# Patient Record
Sex: FEMALE
[De-identification: names, ages, dates, MRNs, and addresses within clinical notes are randomized per-mention and may not be internally consistent; named-entity substitution may affect disease eponyms.]

---

## 2019-03-20 ENCOUNTER — APPOINTMENT (OUTPATIENT)
Dept: OBGYN | Facility: CLINIC | Age: 44
End: 2019-03-20
Payer: COMMERCIAL

## 2019-03-20 VITALS
SYSTOLIC BLOOD PRESSURE: 127 MMHG | HEART RATE: 114 BPM | DIASTOLIC BLOOD PRESSURE: 81 MMHG | HEIGHT: 67.5 IN | BODY MASS INDEX: 30.87 KG/M2 | WEIGHT: 199 LBS

## 2019-03-20 DIAGNOSIS — Z87.440 PERSONAL HISTORY OF URINARY (TRACT) INFECTIONS: ICD-10-CM

## 2019-03-20 DIAGNOSIS — R10.2 PELVIC AND PERINEAL PAIN: ICD-10-CM

## 2019-03-20 PROCEDURE — 99213 OFFICE O/P EST LOW 20 MIN: CPT | Mod: 25

## 2019-03-20 PROCEDURE — 99396 PREV VISIT EST AGE 40-64: CPT

## 2019-03-20 NOTE — COUNSELING
[Breast Self Exam] : breast self exam [Vitamins/Supplements] : vitamins/supplements [Nutrition] : nutrition [Exercise] : exercise [Other ___] : [unfilled]

## 2019-03-20 NOTE — PHYSICAL EXAM
[Awake] : awake [Alert] : alert [Soft] : soft [Oriented x3] : oriented to person, place, and time [No Bleeding] : there was no active vaginal bleeding [Normal] : uterus [Uterine Adnexae] : were not tender and not enlarged [Adnexa Tenderness On The Left] : was tender to palpation [Acute Distress] : no acute distress [Mass] : no breast mass [Nipple Discharge] : no nipple discharge [Axillary LAD] : no axillary lymphadenopathy [Tender] : non tender [Adnexa Tenderness On The Right] : was not tender

## 2019-03-22 LAB
BACTERIA UR CULT: NORMAL
HPV HIGH+LOW RISK DNA PNL CVX: NOT DETECTED

## 2019-03-25 ENCOUNTER — ASOB RESULT (OUTPATIENT)
Age: 44
End: 2019-03-25

## 2019-03-25 ENCOUNTER — APPOINTMENT (OUTPATIENT)
Dept: OBGYN | Facility: CLINIC | Age: 44
End: 2019-03-25
Payer: COMMERCIAL

## 2019-03-25 LAB — CYTOLOGY CVX/VAG DOC THIN PREP: NORMAL

## 2019-03-25 PROCEDURE — 76857 US EXAM PELVIC LIMITED: CPT

## 2019-03-25 PROCEDURE — 76830 TRANSVAGINAL US NON-OB: CPT

## 2019-03-29 ENCOUNTER — APPOINTMENT (OUTPATIENT)
Dept: MAMMOGRAPHY | Facility: IMAGING CENTER | Age: 44
End: 2019-03-29
Payer: COMMERCIAL

## 2019-03-29 ENCOUNTER — OUTPATIENT (OUTPATIENT)
Dept: OUTPATIENT SERVICES | Facility: HOSPITAL | Age: 44
LOS: 1 days | End: 2019-03-29
Payer: COMMERCIAL

## 2019-03-29 DIAGNOSIS — Z98.890 OTHER SPECIFIED POSTPROCEDURAL STATES: Chronic | ICD-10-CM

## 2019-03-29 DIAGNOSIS — Z00.8 ENCOUNTER FOR OTHER GENERAL EXAMINATION: ICD-10-CM

## 2019-03-29 PROCEDURE — 77067 SCR MAMMO BI INCL CAD: CPT

## 2019-03-29 PROCEDURE — 77063 BREAST TOMOSYNTHESIS BI: CPT | Mod: 26

## 2019-03-29 PROCEDURE — 77067 SCR MAMMO BI INCL CAD: CPT | Mod: 26

## 2019-03-29 PROCEDURE — 77063 BREAST TOMOSYNTHESIS BI: CPT

## 2019-04-03 ENCOUNTER — OTHER (OUTPATIENT)
Age: 44
End: 2019-04-03

## 2019-04-03 DIAGNOSIS — N63.0 UNSPECIFIED LUMP IN UNSPECIFIED BREAST: ICD-10-CM

## 2019-04-04 ENCOUNTER — APPOINTMENT (OUTPATIENT)
Dept: OBGYN | Facility: CLINIC | Age: 44
End: 2019-04-04
Payer: COMMERCIAL

## 2019-04-04 VITALS
HEIGHT: 67 IN | BODY MASS INDEX: 30.45 KG/M2 | HEART RATE: 76 BPM | SYSTOLIC BLOOD PRESSURE: 116 MMHG | WEIGHT: 194 LBS | OXYGEN SATURATION: 98 % | DIASTOLIC BLOOD PRESSURE: 76 MMHG

## 2019-04-04 DIAGNOSIS — N94.6 DYSMENORRHEA, UNSPECIFIED: ICD-10-CM

## 2019-04-04 DIAGNOSIS — Z12.11 ENCOUNTER FOR SCREENING FOR MALIGNANT NEOPLASM OF COLON: ICD-10-CM

## 2019-04-04 DIAGNOSIS — R92.8 OTHER ABNORMAL AND INCONCLUSIVE FINDINGS ON DIAGNOSTIC IMAGING OF BREAST: ICD-10-CM

## 2019-04-04 LAB
HCG UR QL: NEGATIVE
QUALITY CONTROL: YES

## 2019-04-04 PROCEDURE — 99214 OFFICE O/P EST MOD 30 MIN: CPT

## 2019-04-04 RX ORDER — KETOROLAC TROMETHAMINE 10 MG/1
10 TABLET, FILM COATED ORAL EVERY 8 HOURS
Qty: 12 | Refills: 3 | Status: ACTIVE | COMMUNITY
Start: 2019-04-04 | End: 1900-01-01

## 2019-04-04 NOTE — CHIEF COMPLAINT
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

## 2019-04-04 NOTE — PHYSICAL EXAM
[Normal] : uterus [No Bleeding] : there was no active vaginal bleeding [Anteversion] : anteverted [Uterine Adnexae] : were not tender and not enlarged

## 2019-04-04 NOTE — COUNSELING
[Breast Self Exam] : breast self exam [Nutrition] : nutrition [Exercise] : exercise [Vitamins/Supplements] : vitamins/supplements [Pain Management] : pain management

## 2019-04-08 ENCOUNTER — TRANSCRIPTION ENCOUNTER (OUTPATIENT)
Age: 44
End: 2019-04-08

## 2019-04-08 ENCOUNTER — APPOINTMENT (OUTPATIENT)
Dept: ULTRASOUND IMAGING | Facility: CLINIC | Age: 44
End: 2019-04-08
Payer: COMMERCIAL

## 2019-04-08 ENCOUNTER — OUTPATIENT (OUTPATIENT)
Dept: OUTPATIENT SERVICES | Facility: HOSPITAL | Age: 44
LOS: 1 days | End: 2019-04-08
Payer: COMMERCIAL

## 2019-04-08 DIAGNOSIS — N63.0 UNSPECIFIED LUMP IN UNSPECIFIED BREAST: ICD-10-CM

## 2019-04-08 DIAGNOSIS — Z00.8 ENCOUNTER FOR OTHER GENERAL EXAMINATION: ICD-10-CM

## 2019-04-08 DIAGNOSIS — Z98.890 OTHER SPECIFIED POSTPROCEDURAL STATES: Chronic | ICD-10-CM

## 2019-04-08 PROCEDURE — 76642 ULTRASOUND BREAST LIMITED: CPT | Mod: 26,LT

## 2019-04-08 PROCEDURE — 76642 ULTRASOUND BREAST LIMITED: CPT

## 2019-09-04 ENCOUNTER — FORM ENCOUNTER (OUTPATIENT)
Age: 44
End: 2019-09-04

## 2019-09-05 ENCOUNTER — APPOINTMENT (OUTPATIENT)
Dept: ULTRASOUND IMAGING | Facility: CLINIC | Age: 44
End: 2019-09-05
Payer: COMMERCIAL

## 2019-09-05 ENCOUNTER — OUTPATIENT (OUTPATIENT)
Dept: OUTPATIENT SERVICES | Facility: HOSPITAL | Age: 44
LOS: 1 days | End: 2019-09-05
Payer: COMMERCIAL

## 2019-09-05 DIAGNOSIS — R10.2 PELVIC AND PERINEAL PAIN: ICD-10-CM

## 2019-09-05 DIAGNOSIS — Z98.890 OTHER SPECIFIED POSTPROCEDURAL STATES: Chronic | ICD-10-CM

## 2019-09-05 PROCEDURE — 76830 TRANSVAGINAL US NON-OB: CPT

## 2019-09-05 PROCEDURE — 76830 TRANSVAGINAL US NON-OB: CPT | Mod: 26

## 2019-09-05 PROCEDURE — 76856 US EXAM PELVIC COMPLETE: CPT

## 2019-09-05 PROCEDURE — 76856 US EXAM PELVIC COMPLETE: CPT | Mod: 26

## 2019-09-06 ENCOUNTER — OTHER (OUTPATIENT)
Age: 44
End: 2019-09-06

## 2019-09-09 ENCOUNTER — INPATIENT (INPATIENT)
Facility: HOSPITAL | Age: 44
LOS: 4 days | Discharge: HOME | End: 2019-09-14
Attending: STUDENT IN AN ORGANIZED HEALTH CARE EDUCATION/TRAINING PROGRAM | Admitting: STUDENT IN AN ORGANIZED HEALTH CARE EDUCATION/TRAINING PROGRAM
Payer: COMMERCIAL

## 2019-09-09 VITALS
DIASTOLIC BLOOD PRESSURE: 78 MMHG | OXYGEN SATURATION: 100 % | HEART RATE: 120 BPM | RESPIRATION RATE: 20 BRPM | SYSTOLIC BLOOD PRESSURE: 138 MMHG | TEMPERATURE: 99 F

## 2019-09-09 DIAGNOSIS — Z98.890 OTHER SPECIFIED POSTPROCEDURAL STATES: Chronic | ICD-10-CM

## 2019-09-09 LAB
ALBUMIN SERPL ELPH-MCNC: 4.3 G/DL — SIGNIFICANT CHANGE UP (ref 3.3–5)
ALBUMIN SERPL ELPH-MCNC: 4.6 G/DL — SIGNIFICANT CHANGE UP (ref 3.3–5)
ALP SERPL-CCNC: 58 U/L — SIGNIFICANT CHANGE UP (ref 40–120)
ALP SERPL-CCNC: 61 U/L — SIGNIFICANT CHANGE UP (ref 40–120)
ALT FLD-CCNC: 35 U/L — HIGH (ref 4–33)
ALT FLD-CCNC: 40 U/L — HIGH (ref 4–33)
ANION GAP SERPL CALC-SCNC: 14 MMO/L — SIGNIFICANT CHANGE UP (ref 7–14)
ANION GAP SERPL CALC-SCNC: 18 MMO/L — HIGH (ref 7–14)
APPEARANCE UR: CLEAR — SIGNIFICANT CHANGE UP
AST SERPL-CCNC: 17 U/L — SIGNIFICANT CHANGE UP (ref 4–32)
AST SERPL-CCNC: 50 U/L — HIGH (ref 4–32)
BASE EXCESS BLDV CALC-SCNC: 0 MMOL/L — SIGNIFICANT CHANGE UP
BASOPHILS # BLD AUTO: 0.02 K/UL — SIGNIFICANT CHANGE UP (ref 0–0.2)
BASOPHILS NFR BLD AUTO: 0.2 % — SIGNIFICANT CHANGE UP (ref 0–2)
BILIRUB SERPL-MCNC: 0.3 MG/DL — SIGNIFICANT CHANGE UP (ref 0.2–1.2)
BILIRUB SERPL-MCNC: 0.4 MG/DL — SIGNIFICANT CHANGE UP (ref 0.2–1.2)
BILIRUB UR-MCNC: NEGATIVE — SIGNIFICANT CHANGE UP
BLOOD GAS VENOUS - CREATININE: 0.53 MG/DL — SIGNIFICANT CHANGE UP (ref 0.5–1.3)
BLOOD UR QL VISUAL: NEGATIVE — SIGNIFICANT CHANGE UP
BUN SERPL-MCNC: 7 MG/DL — SIGNIFICANT CHANGE UP (ref 7–23)
BUN SERPL-MCNC: 7 MG/DL — SIGNIFICANT CHANGE UP (ref 7–23)
CALCIUM SERPL-MCNC: 10.4 MG/DL — SIGNIFICANT CHANGE UP (ref 8.4–10.5)
CALCIUM SERPL-MCNC: 9.8 MG/DL — SIGNIFICANT CHANGE UP (ref 8.4–10.5)
CHLORIDE BLDV-SCNC: 105 MMOL/L — SIGNIFICANT CHANGE UP (ref 96–108)
CHLORIDE SERPL-SCNC: 98 MMOL/L — SIGNIFICANT CHANGE UP (ref 98–107)
CHLORIDE SERPL-SCNC: 98 MMOL/L — SIGNIFICANT CHANGE UP (ref 98–107)
CO2 SERPL-SCNC: 18 MMOL/L — LOW (ref 22–31)
CO2 SERPL-SCNC: 23 MMOL/L — SIGNIFICANT CHANGE UP (ref 22–31)
COLOR SPEC: YELLOW — SIGNIFICANT CHANGE UP
CREAT SERPL-MCNC: 0.47 MG/DL — LOW (ref 0.5–1.3)
CREAT SERPL-MCNC: 0.62 MG/DL — SIGNIFICANT CHANGE UP (ref 0.5–1.3)
EOSINOPHIL # BLD AUTO: 0.1 K/UL — SIGNIFICANT CHANGE UP (ref 0–0.5)
EOSINOPHIL NFR BLD AUTO: 0.8 % — SIGNIFICANT CHANGE UP (ref 0–6)
GAS PNL BLDV: 133 MMOL/L — LOW (ref 136–146)
GLUCOSE BLDV-MCNC: 140 MG/DL — HIGH (ref 70–99)
GLUCOSE SERPL-MCNC: 137 MG/DL — HIGH (ref 70–99)
GLUCOSE SERPL-MCNC: 147 MG/DL — HIGH (ref 70–99)
GLUCOSE UR-MCNC: NEGATIVE — SIGNIFICANT CHANGE UP
HCG SERPL-ACNC: < 5 MIU/ML — SIGNIFICANT CHANGE UP
HCO3 BLDV-SCNC: 24 MMOL/L — SIGNIFICANT CHANGE UP (ref 20–27)
HCT VFR BLD CALC: 42.1 % — SIGNIFICANT CHANGE UP (ref 34.5–45)
HCT VFR BLDV CALC: 40.8 % — SIGNIFICANT CHANGE UP (ref 34.5–45)
HGB BLD-MCNC: 13.3 G/DL — SIGNIFICANT CHANGE UP (ref 11.5–15.5)
HGB BLDV-MCNC: 13.3 G/DL — SIGNIFICANT CHANGE UP (ref 11.5–15.5)
IMM GRANULOCYTES NFR BLD AUTO: 0.3 % — SIGNIFICANT CHANGE UP (ref 0–1.5)
KETONES UR-MCNC: NEGATIVE — SIGNIFICANT CHANGE UP
LACTATE BLDV-MCNC: 3.7 MMOL/L — HIGH (ref 0.5–2)
LEUKOCYTE ESTERASE UR-ACNC: NEGATIVE — SIGNIFICANT CHANGE UP
LIDOCAIN IGE QN: 32.9 U/L — SIGNIFICANT CHANGE UP (ref 7–60)
LYMPHOCYTES # BLD AUTO: 1.65 K/UL — SIGNIFICANT CHANGE UP (ref 1–3.3)
LYMPHOCYTES # BLD AUTO: 12.5 % — LOW (ref 13–44)
MCHC RBC-ENTMCNC: 26.4 PG — LOW (ref 27–34)
MCHC RBC-ENTMCNC: 31.6 % — LOW (ref 32–36)
MCV RBC AUTO: 83.7 FL — SIGNIFICANT CHANGE UP (ref 80–100)
MONOCYTES # BLD AUTO: 0.98 K/UL — HIGH (ref 0–0.9)
MONOCYTES NFR BLD AUTO: 7.4 % — SIGNIFICANT CHANGE UP (ref 2–14)
NEUTROPHILS # BLD AUTO: 10.44 K/UL — HIGH (ref 1.8–7.4)
NEUTROPHILS NFR BLD AUTO: 78.8 % — HIGH (ref 43–77)
NITRITE UR-MCNC: NEGATIVE — SIGNIFICANT CHANGE UP
NRBC # FLD: 0 K/UL — SIGNIFICANT CHANGE UP (ref 0–0)
PCO2 BLDV: 29 MMHG — LOW (ref 41–51)
PH BLDV: 7.5 PH — HIGH (ref 7.32–7.43)
PH UR: 6.5 — SIGNIFICANT CHANGE UP (ref 5–8)
PLATELET # BLD AUTO: 305 K/UL — SIGNIFICANT CHANGE UP (ref 150–400)
PMV BLD: 10.9 FL — SIGNIFICANT CHANGE UP (ref 7–13)
PO2 BLDV: 31 MMHG — LOW (ref 35–40)
POTASSIUM BLDV-SCNC: 3.8 MMOL/L — SIGNIFICANT CHANGE UP (ref 3.4–4.5)
POTASSIUM SERPL-MCNC: 3.8 MMOL/L — SIGNIFICANT CHANGE UP (ref 3.5–5.3)
POTASSIUM SERPL-MCNC: 6 MMOL/L — HIGH (ref 3.5–5.3)
POTASSIUM SERPL-SCNC: 3.8 MMOL/L — SIGNIFICANT CHANGE UP (ref 3.5–5.3)
POTASSIUM SERPL-SCNC: 6 MMOL/L — HIGH (ref 3.5–5.3)
PROT SERPL-MCNC: 8 G/DL — SIGNIFICANT CHANGE UP (ref 6–8.3)
PROT SERPL-MCNC: 8.8 G/DL — HIGH (ref 6–8.3)
PROT UR-MCNC: 50 — SIGNIFICANT CHANGE UP
RBC # BLD: 5.03 M/UL — SIGNIFICANT CHANGE UP (ref 3.8–5.2)
RBC # FLD: 12.4 % — SIGNIFICANT CHANGE UP (ref 10.3–14.5)
SAO2 % BLDV: 59.3 % — LOW (ref 60–85)
SODIUM SERPL-SCNC: 134 MMOL/L — LOW (ref 135–145)
SODIUM SERPL-SCNC: 135 MMOL/L — SIGNIFICANT CHANGE UP (ref 135–145)
SP GR SPEC: 1.03 — SIGNIFICANT CHANGE UP (ref 1–1.04)
TROPONIN T, HIGH SENSITIVITY: < 6 NG/L — SIGNIFICANT CHANGE UP (ref ?–14)
UROBILINOGEN FLD QL: NORMAL — SIGNIFICANT CHANGE UP
WBC # BLD: 13.23 K/UL — HIGH (ref 3.8–10.5)
WBC # FLD AUTO: 13.23 K/UL — HIGH (ref 3.8–10.5)

## 2019-09-09 PROCEDURE — 76830 TRANSVAGINAL US NON-OB: CPT | Mod: 26

## 2019-09-09 PROCEDURE — 74018 RADEX ABDOMEN 1 VIEW: CPT | Mod: 26

## 2019-09-09 PROCEDURE — 74176 CT ABD & PELVIS W/O CONTRAST: CPT | Mod: 26

## 2019-09-09 RX ORDER — ONDANSETRON 8 MG/1
4 TABLET, FILM COATED ORAL ONCE
Refills: 0 | Status: COMPLETED | OUTPATIENT
Start: 2019-09-09 | End: 2019-09-09

## 2019-09-09 RX ORDER — MORPHINE SULFATE 50 MG/1
4 CAPSULE, EXTENDED RELEASE ORAL ONCE
Refills: 0 | Status: DISCONTINUED | OUTPATIENT
Start: 2019-09-09 | End: 2019-09-09

## 2019-09-09 RX ORDER — ACETAMINOPHEN 500 MG
650 TABLET ORAL ONCE
Refills: 0 | Status: COMPLETED | OUTPATIENT
Start: 2019-09-09 | End: 2019-09-09

## 2019-09-09 RX ORDER — SODIUM CHLORIDE 9 MG/ML
2000 INJECTION INTRAMUSCULAR; INTRAVENOUS; SUBCUTANEOUS ONCE
Refills: 0 | Status: COMPLETED | OUTPATIENT
Start: 2019-09-09 | End: 2019-09-09

## 2019-09-09 RX ADMIN — Medication 650 MILLIGRAM(S): at 21:40

## 2019-09-09 RX ADMIN — MORPHINE SULFATE 4 MILLIGRAM(S): 50 CAPSULE, EXTENDED RELEASE ORAL at 21:40

## 2019-09-09 RX ADMIN — MORPHINE SULFATE 4 MILLIGRAM(S): 50 CAPSULE, EXTENDED RELEASE ORAL at 18:50

## 2019-09-09 RX ADMIN — MORPHINE SULFATE 4 MILLIGRAM(S): 50 CAPSULE, EXTENDED RELEASE ORAL at 21:55

## 2019-09-09 RX ADMIN — Medication 650 MILLIGRAM(S): at 21:30

## 2019-09-09 RX ADMIN — SODIUM CHLORIDE 2000 MILLILITER(S): 9 INJECTION INTRAMUSCULAR; INTRAVENOUS; SUBCUTANEOUS at 18:24

## 2019-09-09 RX ADMIN — MORPHINE SULFATE 4 MILLIGRAM(S): 50 CAPSULE, EXTENDED RELEASE ORAL at 18:24

## 2019-09-09 NOTE — ED PROVIDER NOTE - ATTENDING CONTRIBUTION TO CARE
ED Attending (Dr Cruz): I have personally seen and examined this patient and I have fully participated in their care.  I have reviewed all pertinent clinical information, including the history, physical exam, plan and medical student's note, and agree except as noted. 44 y/o f hx of ovarian cysts and DM who p/w 4 days of LLQ abdominal pain waxing and waning. +F/N/V. Concern for possible diverticulitis vs nephrolithiasis vs pyelo. Will get CT abdomen. Will r/o ACS ECG and troponin. Will r/o pancreatitis with lipase. Will r/o pyelo with UA.

## 2019-09-09 NOTE — ED ADULT NURSE NOTE - OBJECTIVE STATEMENT
Pt to bed tr a. Alert and oriented x 3. Pt c.o lower right abd pain n/v SOB and epigastric pain. IVL placed. Bloods drawn. Will continue to monitor.

## 2019-09-09 NOTE — ED ADULT NURSE NOTE - CHIEF COMPLAINT QUOTE
Returned from [place] 9/1/19.  Pt c/o LLQ abd pain, abd distention, chills, nausea, SOB, and CP  PMH ovarian cysts  US Thursday was normal

## 2019-09-09 NOTE — ED PROVIDER NOTE - OBJECTIVE STATEMENT
Ms. Almanza is a 42 y/o f with a hx of DM, hypothyroidism, ovarian cysts who p/w 4 days of gradually worsening LLQ abdominal pain. Pt thought it was her ovary initially and had a pelvic US done on Friday which was normal. Since then she has had chills, nausea, vomiting. She had two BM today that were normal. She denies any dysuria, hematuria, or increased urinary frequency. Ms. Almanza is a 44 y/o f with a hx of DM, hypothyroidism, ovarian cysts who p/w 4 days of gradually worsening waxing and wanning LLQ abdominal pain. Pt thought it was her ovary initially and had a pelvic US done on Friday which was normal. Since then she has had chills, nausea, vomiting. She had two BM today that were normal. She denies any dysuria, hematuria, or increased urinary frequency. 44 y/o F with a hx of DM, hypothyroidism, ovarian cysts who p/w 4 days of gradually worsening waxing and waning LLQ abdominal pain. Pt thought it was her ovary initially and had a pelvic US done on Friday which was normal. Since then she has had chills, nausea, vomiting. She had two BM today that were normal. She denies any dysuria, hematuria, or increased urinary frequency. No recent travel or known sick contacts.

## 2019-09-09 NOTE — ED PROVIDER NOTE - CLINICAL SUMMARY MEDICAL DECISION MAKING FREE TEXT BOX
44 y/o f hx of ovarian cysts and DM who p/w 4 days of LLQ abdominal pain waxing and waning. +F/N/V. Concern for possible diverticulitis vs nephrolithiasis. Will get CT abdomen. Will r/o ACS ECG and troponin. Will r/o pancreatitis with lipase. Will r/o pyelo with UA. 42 y/o f hx of ovarian cysts and DM who p/w 4 days of LLQ abdominal pain waxing and waning. +F/N/V. Concern for possible diverticulitis vs nephrolithiasis vs pyelo. Will get CT abdomen. Will r/o ACS ECG and troponin. Will r/o pancreatitis with lipase. Will r/o pyelo with UA.

## 2019-09-09 NOTE — ED PROVIDER NOTE - PROGRESS NOTE DETAILS
[facility] PGY2- persistently febrile and tachy, imaging neg, elevated WBC with lactate, cx sent, covered with zosyn, admit to hospitalist, Dr. Greogrio, pt feeling better, looks mildly ill but non toxic

## 2019-09-10 DIAGNOSIS — E03.9 HYPOTHYROIDISM, UNSPECIFIED: ICD-10-CM

## 2019-09-10 DIAGNOSIS — Z29.9 ENCOUNTER FOR PROPHYLACTIC MEASURES, UNSPECIFIED: ICD-10-CM

## 2019-09-10 DIAGNOSIS — Z98.890 OTHER SPECIFIED POSTPROCEDURAL STATES: Chronic | ICD-10-CM

## 2019-09-10 DIAGNOSIS — R65.10 SYSTEMIC INFLAMMATORY RESPONSE SYNDROME (SIRS) OF NON-INFECTIOUS ORIGIN WITHOUT ACUTE ORGAN DYSFUNCTION: ICD-10-CM

## 2019-09-10 DIAGNOSIS — A41.9 SEPSIS, UNSPECIFIED ORGANISM: ICD-10-CM

## 2019-09-10 DIAGNOSIS — R10.9 UNSPECIFIED ABDOMINAL PAIN: ICD-10-CM

## 2019-09-10 DIAGNOSIS — E11.9 TYPE 2 DIABETES MELLITUS WITHOUT COMPLICATIONS: ICD-10-CM

## 2019-09-10 LAB
ALBUMIN SERPL ELPH-MCNC: 3.9 G/DL — SIGNIFICANT CHANGE UP (ref 3.3–5)
ALP SERPL-CCNC: 52 U/L — SIGNIFICANT CHANGE UP (ref 40–120)
ALT FLD-CCNC: 23 U/L — SIGNIFICANT CHANGE UP (ref 4–33)
ANION GAP SERPL CALC-SCNC: 13 MMO/L — SIGNIFICANT CHANGE UP (ref 7–14)
AST SERPL-CCNC: 11 U/L — SIGNIFICANT CHANGE UP (ref 4–32)
BASE EXCESS BLDV CALC-SCNC: -4.1 MMOL/L — SIGNIFICANT CHANGE UP
BILIRUB SERPL-MCNC: 0.5 MG/DL — SIGNIFICANT CHANGE UP (ref 0.2–1.2)
BUN SERPL-MCNC: 5 MG/DL — LOW (ref 7–23)
CALCIUM SERPL-MCNC: 8.3 MG/DL — LOW (ref 8.4–10.5)
CHLORIDE SERPL-SCNC: 101 MMOL/L — SIGNIFICANT CHANGE UP (ref 98–107)
CO2 SERPL-SCNC: 19 MMOL/L — LOW (ref 22–31)
CREAT SERPL-MCNC: 0.58 MG/DL — SIGNIFICANT CHANGE UP (ref 0.5–1.3)
GAS PNL BLDV: 130 MMOL/L — LOW (ref 136–146)
GLUCOSE BLDC GLUCOMTR-MCNC: 193 MG/DL — HIGH (ref 70–99)
GLUCOSE BLDC GLUCOMTR-MCNC: 195 MG/DL — HIGH (ref 70–99)
GLUCOSE BLDC GLUCOMTR-MCNC: 196 MG/DL — HIGH (ref 70–99)
GLUCOSE BLDC GLUCOMTR-MCNC: 254 MG/DL — HIGH (ref 70–99)
GLUCOSE BLDV-MCNC: 196 MG/DL — HIGH (ref 70–99)
GLUCOSE SERPL-MCNC: 216 MG/DL — HIGH (ref 70–99)
HCO3 BLDV-SCNC: 21 MMOL/L — SIGNIFICANT CHANGE UP (ref 20–27)
HCT VFR BLD CALC: 33.5 % — LOW (ref 34.5–45)
HCT VFR BLDV CALC: 38.6 % — SIGNIFICANT CHANGE UP (ref 34.5–45)
HGB BLD-MCNC: 11.1 G/DL — LOW (ref 11.5–15.5)
HGB BLDV-MCNC: 12.5 G/DL — SIGNIFICANT CHANGE UP (ref 11.5–15.5)
LACTATE BLDV-MCNC: 3.1 MMOL/L — HIGH (ref 0.5–2)
LACTATE SERPL-SCNC: 2.9 MMOL/L — HIGH (ref 0.5–2)
MAGNESIUM SERPL-MCNC: 1.2 MG/DL — LOW (ref 1.6–2.6)
MCHC RBC-ENTMCNC: 27.2 PG — SIGNIFICANT CHANGE UP (ref 27–34)
MCHC RBC-ENTMCNC: 33.1 % — SIGNIFICANT CHANGE UP (ref 32–36)
MCV RBC AUTO: 82.1 FL — SIGNIFICANT CHANGE UP (ref 80–100)
NRBC # FLD: 0 K/UL — SIGNIFICANT CHANGE UP (ref 0–0)
PCO2 BLDV: 37 MMHG — LOW (ref 41–51)
PH BLDV: 7.36 PH — SIGNIFICANT CHANGE UP (ref 7.32–7.43)
PHOSPHATE SERPL-MCNC: 2.7 MG/DL — SIGNIFICANT CHANGE UP (ref 2.5–4.5)
PLATELET # BLD AUTO: 221 K/UL — SIGNIFICANT CHANGE UP (ref 150–400)
PMV BLD: 10.2 FL — SIGNIFICANT CHANGE UP (ref 7–13)
PO2 BLDV: 175 MMHG — HIGH (ref 35–40)
POTASSIUM BLDV-SCNC: 3.8 MMOL/L — SIGNIFICANT CHANGE UP (ref 3.4–4.5)
POTASSIUM SERPL-MCNC: 3.7 MMOL/L — SIGNIFICANT CHANGE UP (ref 3.5–5.3)
POTASSIUM SERPL-SCNC: 3.7 MMOL/L — SIGNIFICANT CHANGE UP (ref 3.5–5.3)
PROCALCITONIN SERPL-MCNC: 0.08 NG/ML — SIGNIFICANT CHANGE UP (ref 0.02–0.1)
PROLACTIN SERPL-MCNC: 8.5 NG/ML — SIGNIFICANT CHANGE UP (ref 3.4–24.1)
PROT SERPL-MCNC: 6.8 G/DL — SIGNIFICANT CHANGE UP (ref 6–8.3)
RBC # BLD: 4.08 M/UL — SIGNIFICANT CHANGE UP (ref 3.8–5.2)
RBC # FLD: 12.4 % — SIGNIFICANT CHANGE UP (ref 10.3–14.5)
SAO2 % BLDV: 99.3 % — HIGH (ref 60–85)
SODIUM SERPL-SCNC: 133 MMOL/L — LOW (ref 135–145)
WBC # BLD: 11.87 K/UL — HIGH (ref 3.8–10.5)
WBC # FLD AUTO: 11.87 K/UL — HIGH (ref 3.8–10.5)

## 2019-09-10 PROCEDURE — 99223 1ST HOSP IP/OBS HIGH 75: CPT | Mod: GC

## 2019-09-10 PROCEDURE — 12345: CPT | Mod: NC,GC

## 2019-09-10 RX ORDER — DEXTROSE 50 % IN WATER 50 %
12.5 SYRINGE (ML) INTRAVENOUS ONCE
Refills: 0 | Status: DISCONTINUED | OUTPATIENT
Start: 2019-09-10 | End: 2019-09-14

## 2019-09-10 RX ORDER — VANCOMYCIN HCL 1 G
1000 VIAL (EA) INTRAVENOUS ONCE
Refills: 0 | Status: DISCONTINUED | OUTPATIENT
Start: 2019-09-10 | End: 2019-09-10

## 2019-09-10 RX ORDER — GLUCAGON INJECTION, SOLUTION 0.5 MG/.1ML
1 INJECTION, SOLUTION SUBCUTANEOUS ONCE
Refills: 0 | Status: DISCONTINUED | OUTPATIENT
Start: 2019-09-10 | End: 2019-09-14

## 2019-09-10 RX ORDER — PIPERACILLIN AND TAZOBACTAM 4; .5 G/20ML; G/20ML
3.38 INJECTION, POWDER, LYOPHILIZED, FOR SOLUTION INTRAVENOUS EVERY 8 HOURS
Refills: 0 | Status: DISCONTINUED | OUTPATIENT
Start: 2019-09-10 | End: 2019-09-12

## 2019-09-10 RX ORDER — PIPERACILLIN AND TAZOBACTAM 4; .5 G/20ML; G/20ML
3.38 INJECTION, POWDER, LYOPHILIZED, FOR SOLUTION INTRAVENOUS ONCE
Refills: 0 | Status: COMPLETED | OUTPATIENT
Start: 2019-09-10 | End: 2019-09-10

## 2019-09-10 RX ORDER — KETOROLAC TROMETHAMINE 30 MG/ML
15 SYRINGE (ML) INJECTION ONCE
Refills: 0 | Status: DISCONTINUED | OUTPATIENT
Start: 2019-09-10 | End: 2019-09-10

## 2019-09-10 RX ORDER — SODIUM CHLORIDE 9 MG/ML
1000 INJECTION INTRAMUSCULAR; INTRAVENOUS; SUBCUTANEOUS
Refills: 0 | Status: DISCONTINUED | OUTPATIENT
Start: 2019-09-10 | End: 2019-09-10

## 2019-09-10 RX ORDER — ONDANSETRON 8 MG/1
4 TABLET, FILM COATED ORAL ONCE
Refills: 0 | Status: COMPLETED | OUTPATIENT
Start: 2019-09-10 | End: 2019-09-10

## 2019-09-10 RX ORDER — MORPHINE SULFATE 50 MG/1
4 CAPSULE, EXTENDED RELEASE ORAL EVERY 4 HOURS
Refills: 0 | Status: DISCONTINUED | OUTPATIENT
Start: 2019-09-10 | End: 2019-09-10

## 2019-09-10 RX ORDER — DEXTROSE 50 % IN WATER 50 %
25 SYRINGE (ML) INTRAVENOUS ONCE
Refills: 0 | Status: DISCONTINUED | OUTPATIENT
Start: 2019-09-10 | End: 2019-09-14

## 2019-09-10 RX ORDER — IBUPROFEN 200 MG
600 TABLET ORAL ONCE
Refills: 0 | Status: COMPLETED | OUTPATIENT
Start: 2019-09-10 | End: 2019-09-10

## 2019-09-10 RX ORDER — SODIUM CHLORIDE 9 MG/ML
1000 INJECTION INTRAMUSCULAR; INTRAVENOUS; SUBCUTANEOUS ONCE
Refills: 0 | Status: COMPLETED | OUTPATIENT
Start: 2019-09-10 | End: 2019-09-10

## 2019-09-10 RX ORDER — ACETAMINOPHEN 500 MG
975 TABLET ORAL ONCE
Refills: 0 | Status: COMPLETED | OUTPATIENT
Start: 2019-09-10 | End: 2019-09-10

## 2019-09-10 RX ORDER — SODIUM CHLORIDE 9 MG/ML
1000 INJECTION, SOLUTION INTRAVENOUS
Refills: 0 | Status: DISCONTINUED | OUTPATIENT
Start: 2019-09-10 | End: 2019-09-14

## 2019-09-10 RX ORDER — MAGNESIUM SULFATE 500 MG/ML
2 VIAL (ML) INJECTION ONCE
Refills: 0 | Status: COMPLETED | OUTPATIENT
Start: 2019-09-10 | End: 2019-09-10

## 2019-09-10 RX ORDER — VANCOMYCIN HCL 1 G
VIAL (EA) INTRAVENOUS
Refills: 0 | Status: DISCONTINUED | OUTPATIENT
Start: 2019-09-10 | End: 2019-09-10

## 2019-09-10 RX ORDER — INSULIN LISPRO 100/ML
VIAL (ML) SUBCUTANEOUS AT BEDTIME
Refills: 0 | Status: DISCONTINUED | OUTPATIENT
Start: 2019-09-10 | End: 2019-09-14

## 2019-09-10 RX ORDER — KETOROLAC TROMETHAMINE 30 MG/ML
0 SYRINGE (ML) INJECTION
Qty: 0 | Refills: 0 | DISCHARGE

## 2019-09-10 RX ORDER — ACETAMINOPHEN 500 MG
650 TABLET ORAL EVERY 6 HOURS
Refills: 0 | Status: DISCONTINUED | OUTPATIENT
Start: 2019-09-10 | End: 2019-09-14

## 2019-09-10 RX ORDER — VANCOMYCIN HCL 1 G
1000 VIAL (EA) INTRAVENOUS EVERY 12 HOURS
Refills: 0 | Status: DISCONTINUED | OUTPATIENT
Start: 2019-09-10 | End: 2019-09-10

## 2019-09-10 RX ORDER — LEVOTHYROXINE SODIUM 125 MCG
175 TABLET ORAL DAILY
Refills: 0 | Status: DISCONTINUED | OUTPATIENT
Start: 2019-09-10 | End: 2019-09-14

## 2019-09-10 RX ORDER — INSULIN LISPRO 100/ML
VIAL (ML) SUBCUTANEOUS
Refills: 0 | Status: DISCONTINUED | OUTPATIENT
Start: 2019-09-10 | End: 2019-09-14

## 2019-09-10 RX ORDER — DEXTROSE 50 % IN WATER 50 %
15 SYRINGE (ML) INTRAVENOUS ONCE
Refills: 0 | Status: DISCONTINUED | OUTPATIENT
Start: 2019-09-10 | End: 2019-09-14

## 2019-09-10 RX ORDER — SODIUM CHLORIDE 9 MG/ML
1000 INJECTION INTRAMUSCULAR; INTRAVENOUS; SUBCUTANEOUS
Refills: 0 | Status: DISCONTINUED | OUTPATIENT
Start: 2019-09-10 | End: 2019-09-11

## 2019-09-10 RX ORDER — SITAGLIPTIN AND METFORMIN HYDROCHLORIDE 500; 50 MG/1; MG/1
1 TABLET, FILM COATED ORAL
Qty: 0 | Refills: 0 | DISCHARGE

## 2019-09-10 RX ADMIN — Medication 975 MILLIGRAM(S): at 06:34

## 2019-09-10 RX ADMIN — Medication 15 MILLIGRAM(S): at 18:05

## 2019-09-10 RX ADMIN — Medication 15 MILLIGRAM(S): at 03:12

## 2019-09-10 RX ADMIN — Medication 50 GRAM(S): at 09:45

## 2019-09-10 RX ADMIN — Medication 3: at 18:38

## 2019-09-10 RX ADMIN — Medication 15 MILLIGRAM(S): at 08:22

## 2019-09-10 RX ADMIN — SODIUM CHLORIDE 125 MILLILITER(S): 9 INJECTION INTRAMUSCULAR; INTRAVENOUS; SUBCUTANEOUS at 08:51

## 2019-09-10 RX ADMIN — Medication 1: at 14:19

## 2019-09-10 RX ADMIN — SODIUM CHLORIDE 1000 MILLILITER(S): 9 INJECTION INTRAMUSCULAR; INTRAVENOUS; SUBCUTANEOUS at 07:26

## 2019-09-10 RX ADMIN — PIPERACILLIN AND TAZOBACTAM 200 GRAM(S): 4; .5 INJECTION, POWDER, LYOPHILIZED, FOR SOLUTION INTRAVENOUS at 03:12

## 2019-09-10 RX ADMIN — MORPHINE SULFATE 4 MILLIGRAM(S): 50 CAPSULE, EXTENDED RELEASE ORAL at 00:08

## 2019-09-10 RX ADMIN — MORPHINE SULFATE 4 MILLIGRAM(S): 50 CAPSULE, EXTENDED RELEASE ORAL at 00:30

## 2019-09-10 RX ADMIN — PIPERACILLIN AND TAZOBACTAM 25 GRAM(S): 4; .5 INJECTION, POWDER, LYOPHILIZED, FOR SOLUTION INTRAVENOUS at 18:17

## 2019-09-10 RX ADMIN — Medication 175 MICROGRAM(S): at 07:16

## 2019-09-10 RX ADMIN — ONDANSETRON 4 MILLIGRAM(S): 8 TABLET, FILM COATED ORAL at 13:19

## 2019-09-10 RX ADMIN — SODIUM CHLORIDE 1000 MILLILITER(S): 9 INJECTION INTRAMUSCULAR; INTRAVENOUS; SUBCUTANEOUS at 09:14

## 2019-09-10 RX ADMIN — Medication 1: at 09:21

## 2019-09-10 RX ADMIN — PIPERACILLIN AND TAZOBACTAM 25 GRAM(S): 4; .5 INJECTION, POWDER, LYOPHILIZED, FOR SOLUTION INTRAVENOUS at 10:49

## 2019-09-10 RX ADMIN — Medication 15 MILLIGRAM(S): at 17:08

## 2019-09-10 RX ADMIN — Medication 650 MILLIGRAM(S): at 18:17

## 2019-09-10 RX ADMIN — Medication 650 MILLIGRAM(S): at 12:00

## 2019-09-10 RX ADMIN — Medication 975 MILLIGRAM(S): at 08:22

## 2019-09-10 RX ADMIN — SODIUM CHLORIDE 1000 MILLILITER(S): 9 INJECTION INTRAMUSCULAR; INTRAVENOUS; SUBCUTANEOUS at 02:18

## 2019-09-10 NOTE — ED ADULT NURSE REASSESSMENT NOTE - NS ED NURSE REASSESS COMMENT FT1
pt ambulated to bathroom, began to vomit, pt medicated as per MD orders, AIDEN Franklin informed, pt sent to essu 3 report given to RN

## 2019-09-10 NOTE — H&P ADULT - NSICDXFAMILYHX_GEN_ALL_CORE_FT
FAMILY HISTORY:  Family history of thyroid cancer FAMILY HISTORY:  Family history of thyroid cancer  FH: diabetes mellitus

## 2019-09-10 NOTE — PROGRESS NOTE ADULT - PROBLEM SELECTOR PLAN 1
Pt presenting with LLQ abdominal pain.   - Pelvic US unremarkable so ovarian cyst less likely.  - cystitis unlikely as UA negative  - CT A/P noncon shows no diverticulitis or nephrolithiasis; however does demonstrate numerous subcentimeter short axis mesenteric lymph nodes  -Possibly diverticulitis not visible on CT  - pt had episode of watery diarrhea in ED. check GI PCR  (no recent abx to increase risk for c-diff)  - c/w zosyn  - c/w morphine 4 mg IV q4, apap prn Pt presenting with LLQ abdominal pain.   - Pelvic US unremarkable so ovarian cyst less likely.  - cystitis unlikely as UA negative  - CT A/P noncon shows no diverticulitis or nephrolithiasis; however does demonstrate numerous subcentimeter short axis mesenteric lymph nodes  -Possibly diverticulitis not visible on CT  - pt had episode of watery diarrhea in ED. check GI PCR  (no recent abx to increase risk for c-diff)  - c/w zosyn  - c/w apap prn

## 2019-09-10 NOTE — ED ADULT NURSE REASSESSMENT NOTE - NS ED NURSE REASSESS COMMENT FT1
received report from RN night, pt A&OX3 ambulatory to afebrile at this time, denies cp sob n/v, reports one episode of loose stool this morning, stool specimen sent to lab,  at bedside will monitor,

## 2019-09-10 NOTE — PROGRESS NOTE ADULT - PROBLEM SELECTOR PLAN 5
DVT ppx: SCDs  Diet: regular  full code DVT ppx: Improve score low  Diet: consistent carbohydrates  full code    Stephy Davis MD: Patient evaluated and interviewed with MS[facility]. Agree with above.

## 2019-09-10 NOTE — PROGRESS NOTE ADULT - SUBJECTIVE AND OBJECTIVE BOX
SUBJECTIVE:    Patient was seen in the hallway in  the ED. She endorses intermittent sleep due to scheduled waking for vital and medication. She endorses episodes of severe rigors this morning for which she received Tylenol. She has poor appetite not having eaten since Sunday she reported and still complains of abdominal pain, now intermittent coming in waves, and throbbing in sensation, peaking to an 8/10, 4/10 at rest. She also endorses persistent nausea and had an episode of loose watery brown stool this morning , her second diarrheal movement since admission. She says that she is also fatigued.  She denies headache, URI symptoms, emesis today, and joint pains.    OBJECTIVE:    Vital Signs Last 24 Hrs  T(C): 38.7 (10 Sep 2019 12:01), Max: 39 (09 Sep 2019 21:15)  T(F): 101.7 (10 Sep 2019 12:01), Max: 102.2 (09 Sep 2019 21:15)  HR: 109 (10 Sep 2019 10:50) (109 - 132)  BP: 119/59 (10 Sep 2019 10:50) (119/59 - 139/78)  BP(mean): --  RR: 18 (10 Sep 2019 10:50) (17 - 20)  SpO2: 100% (10 Sep 2019 10:50) (98% - 100%)    GENERAL: NAD, well-developed  HEAD:  Atraumatic, Normocephalic  EYES: EOMI, PERRLA, conjunctiva and sclera clear  NECK: Supple, No JVD  CHEST/LUNG: Clear to auscultation bilaterally; No wheeze  HEART: Tachycardic rate and rhythm; No murmurs, rubs, or gallops  ABDOMEN: Soft, Tender to light palpation in the LLQ more medial than lateral, Distended but improved since admission; Bowel sounds present.  EXTREMITIES:  2+ Peripheral Pulses, No clubbing, cyanosis, or edema  PSYCH: AAOx3  NEUROLOGY: non-focal  SKIN: No rashes or lesions                          11.1   11.87 )-----------( 221      ( 10 Sep 2019 06:52 )             33.5       09-10    133<L>  |  101  |  5<L>  ----------------------------<  216<H>  3.7   |  19<L>  |  0.58    Ca    8.3<L>      10 Sep 2019 06:52  Phos  2.7     09-10  Mg     1.2     09-10    TPro  6.8  /  Alb  3.9  /  TBili  0.5  /  DBili  x   /  AST  11  /  ALT  23  /  AlkPhos  52  09-10 SUBJECTIVE:    Patient was seen in the hallway in  the ED. She endorses intermittent sleep due to scheduled waking for vital and medication. She endorses episodes of severe rigors this morning for which she received Tylenol. She has poor appetite not having eaten since  she reported and still complains of abdominal pain, now intermittent coming in waves, and throbbing in sensation, peaking to an 8/10, 4/10 at rest. She also endorses persistent nausea and had an episode of loose watery brown stool this morning , her second diarrheal movement since admission. She says that she is also fatigued.  She denies headache, URI symptoms, emesis today, and joint pains.  Of mention, the patient noted that her LMP was 2019, she is s/p tubal ligation done at the same time as her last . She has never been diagnosed with STI/STD and is still currently sexually active with her  only without contraceptive use.    OBJECTIVE:    Vital Signs Last 24 Hrs  T(C): 38.7 (10 Sep 2019 12:01), Max: 39 (09 Sep 2019 21:15)  T(F): 101.7 (10 Sep 2019 12:01), Max: 102.2 (09 Sep 2019 21:15)  HR: 109 (10 Sep 2019 10:50) (109 - 132)  BP: 119/59 (10 Sep 2019 10:50) (119/59 - 139/78)  BP(mean): --  RR: 18 (10 Sep 2019 10:50) (17 - 20)  SpO2: 100% (10 Sep 2019 10:50) (98% - 100%)    GENERAL: NAD, well-developed  HEAD:  Atraumatic, Normocephalic  EYES: EOMI, PERRLA, conjunctiva and sclera clear  NECK: Supple, No JVD  CHEST/LUNG: Clear to auscultation bilaterally; No wheeze  HEART: Tachycardic rate and rhythm; No murmurs, rubs, or gallops  ABDOMEN: Soft, Tender to light palpation in the LLQ more medial than lateral, Distended but improved since admission; Bowel sounds present.  EXTREMITIES:  2+ Peripheral Pulses, No clubbing, cyanosis, or edema  PSYCH: AAOx3  NEUROLOGY: non-focal  SKIN: No rashes or lesions                          11.1   11.87 )-----------( 221      ( 10 Sep 2019 06:52 )             33.5       09-10    133<L>  |  101  |  5<L>  ----------------------------<  216<H>  3.7   |  19<L>  |  0.58    Ca    8.3<L>      10 Sep 2019 06:52  Phos  2.7     09-10  Mg     1.2     09-10    TPro  6.8  /  Alb  3.9  /  TBili  0.5  /  DBili  x   /  AST  11  /  ALT  23  /  AlkPhos  52  09-10

## 2019-09-10 NOTE — PROGRESS NOTE ADULT - PROBLEM SELECTOR PLAN 2
Pt meets criteria on account of leukocytosis, fever. Lactate elevated to 3.7. suspected source is intraabdominal or GI based on LLQ pain and diarrhea  -s/p 3 L.  No respiratory issues - give an additional 1 L  - f/u Bcx  - f/u procalcitonin  - f/u repeat lactate  - Strict Is & Os for insensible losses secondary to diarrhea and increased respiratory rate

## 2019-09-10 NOTE — H&P ADULT - NSHPLABSRESULTS_GEN_ALL_CORE
LABS:                         13.3   13.23 )-----------( 305      ( 09 Sep 2019 18:10 )             42.1         135  |  98  |  7   ----------------------------<  137<H>  3.8   |  23  |  0.62    Ca    9.8      09 Sep 2019 19:15    TPro  8.0  /  Alb  4.3  /  TBili  0.3  /  DBili  x   /  AST  17  /  ALT  35<H>  /  AlkPhos  58        Urinalysis Basic - ( 09 Sep 2019 18:41 )    Color: YELLOW / Appearance: CLEAR / S.029 / pH: 6.5  Gluc: NEGATIVE / Ketone: NEGATIVE  / Bili: NEGATIVE / Urobili: NORMAL   Blood: NEGATIVE / Protein: 50 / Nitrite: NEGATIVE   Leuk Esterase: NEGATIVE / RBC: x / WBC x   Sq Epi: x / Non Sq Epi: x / Bacteria: x            RADIOLOGY, EKG & ADDITIONAL TESTS: Reviewed. LABS:                         13.3   13.23 )-----------( 305      ( 09 Sep 2019 18:10 )             42.1         135  |  98  |  7   ----------------------------<  137<H>  3.8   |  23  |  0.62    Ca    9.8      09 Sep 2019 19:15    TPro  8.0  /  Alb  4.3  /  TBili  0.3  /  DBili  x   /  AST  17  /  ALT  35<H>  /  AlkPhos  58        Urinalysis Basic - ( 09 Sep 2019 18:41 )    Color: YELLOW / Appearance: CLEAR / S.029 / pH: 6.5  Gluc: NEGATIVE / Ketone: NEGATIVE  / Bili: NEGATIVE / Urobili: NORMAL   Blood: NEGATIVE / Protein: 50 / Nitrite: NEGATIVE   Leuk Esterase: NEGATIVE / RBC: x / WBC x   Sq Epi: x / Non Sq Epi: x / Bacteria: x    RADIOLOGY, EKG & ADDITIONAL TESTS: Reviewed.    < from: CT Abdomen and Pelvis No Cont (19 @ 20:40) >    EXAM:  CT ABDOMEN AND PELVIS        PROCEDURE DATE:  Sep  9 2019         INTERPRETATION:  CLINICAL INFORMATION: Left lower quadrant tenderness    COMPARISON: CT abdomen pelvis dated 2016..    PROCEDURE:   CT of the Abdomen and Pelvis was performed without intravenous contrast.   Intravenous contrast: None.  Oral contrast: None.  Sagittal and coronal reformats were performed.    FINDINGS:    LOWER CHEST: Bibasilar subsegmental atelectasis.    LIVER: Within normal limits.  BILE DUCTS: Normalcaliber.  GALLBLADDER: Within normal limits.  SPLEEN: Within normal limits.  PANCREAS: Within normal limits.  ADRENALS: Within normal limits.  KIDNEYS/URETERS: Within normal limits.    BLADDER: Within normal limits.  REPRODUCTIVE ORGANS: Uterus and adnexa within normal limits.    BOWEL: No bowel obstruction. Appendix is not visualized.  PERITONEUM: No ascites. Numerous subcentimeter short axis mesenteric   lymph nodes  VESSELS: Within normal limits.  RETROPERITONEUM/LYMPH NODES: No lymphadenopathy.    ABDOMINAL WALL: Within normal limits.  BONES: Mild degenerative changes.    IMPRESSION:     Normal appendix.    No urolithiasis or diverticulitis. LABS:                         13.3   13.23 )-----------( 305      ( 09 Sep 2019 18:10 )             42.1         135  |  98  |  7   ----------------------------<  137<H>  3.8   |  23  |  0.62    Ca    9.8      09 Sep 2019 19:15    TPro  8.0  /  Alb  4.3  /  TBili  0.3  /  DBili  x   /  AST  17  /  ALT  35<H>  /  AlkPhos  58        Urinalysis Basic - ( 09 Sep 2019 18:41 )    Color: YELLOW / Appearance: CLEAR / S.029 / pH: 6.5  Gluc: NEGATIVE / Ketone: NEGATIVE  / Bili: NEGATIVE / Urobili: NORMAL   Blood: NEGATIVE / Protein: 50 / Nitrite: NEGATIVE   Leuk Esterase: NEGATIVE / RBC: x / WBC x   Sq Epi: x / Non Sq Epi: x / Bacteria: x    RADIOLOGY, EKG & ADDITIONAL TESTS: Reviewed.    < from: CT Abdomen and Pelvis No Cont (19 @ 20:40) >    EXAM:  CT ABDOMEN AND PELVIS        PROCEDURE DATE:  Sep  9 2019         INTERPRETATION:  CLINICAL INFORMATION: Left lower quadrant tenderness    COMPARISON: CT abdomen pelvis dated 2016..    PROCEDURE:   CT of the Abdomen and Pelvis was performed without intravenous contrast.   Intravenous contrast: None.  Oral contrast: None.  Sagittal and coronal reformats were performed.    FINDINGS:    LOWER CHEST: Bibasilar subsegmental atelectasis.    LIVER: Within normal limits.  BILE DUCTS: Normal caliber.  GALLBLADDER: Within normal limits.  SPLEEN: Within normal limits.  PANCREAS: Within normal limits.  ADRENALS: Within normal limits.  KIDNEYS/URETERS: Within normal limits.    BLADDER: Within normal limits.  REPRODUCTIVE ORGANS: Uterus and adnexa within normal limits.    BOWEL: No bowel obstruction. Appendix is not visualized.  PERITONEUM: No ascites. Numerous subcentimeter short axis mesenteric   lymph nodes  VESSELS: Within normal limits.  RETROPERITONEUM/LYMPH NODES: No lymphadenopathy.    ABDOMINAL WALL: Within normal limits.  BONES: Mild degenerative changes.    IMPRESSION:     Normal appendix.    No urolithiasis or diverticulitis. LABS:                         13.3   13.23 )-----------( 305      ( 09 Sep 2019 18:10 )             42.1         135  |  98  |  7   ----------------------------<  137<H>  3.8   |  23  |  0.62    Ca    9.8      09 Sep 2019 19:15    TPro  8.0  /  Alb  4.3  /  TBili  0.3  /  DBili  x   /  AST  17  /  ALT  35<H>  /  AlkPhos  58        Urinalysis Basic - ( 09 Sep 2019 18:41 )    Color: YELLOW / Appearance: CLEAR / S.029 / pH: 6.5  Gluc: NEGATIVE / Ketone: NEGATIVE  / Bili: NEGATIVE / Urobili: NORMAL   Blood: NEGATIVE / Protein: 50 / Nitrite: NEGATIVE   Leuk Esterase: NEGATIVE / RBC: x / WBC x   Sq Epi: x / Non Sq Epi: x / Bacteria: x    EKG personally reviewed and sinus tachy without acute st-t changes    RADIOLOGY, EKG & ADDITIONAL TESTS: Reviewed.    < from: CT Abdomen and Pelvis No Cont (19 @ 20:40) >    EXAM:  CT ABDOMEN AND PELVIS        PROCEDURE DATE:  Sep  9 2019         INTERPRETATION:  CLINICAL INFORMATION: Left lower quadrant tenderness    COMPARISON: CT abdomen pelvis dated 2016..    PROCEDURE:   CT of the Abdomen and Pelvis was performed without intravenous contrast.   Intravenous contrast: None.  Oral contrast: None.  Sagittal and coronal reformats were performed.    FINDINGS:    LOWER CHEST: Bibasilar subsegmental atelectasis.    LIVER: Within normal limits.  BILE DUCTS: Normal caliber.  GALLBLADDER: Within normal limits.  SPLEEN: Within normal limits.  PANCREAS: Within normal limits.  ADRENALS: Within normal limits.  KIDNEYS/URETERS: Within normal limits.    BLADDER: Within normal limits.  REPRODUCTIVE ORGANS: Uterus and adnexa within normal limits.    BOWEL: No bowel obstruction. Appendix is not visualized.  PERITONEUM: No ascites. Numerous subcentimeter short axis mesenteric   lymph nodes  VESSELS: Within normal limits.  RETROPERITONEUM/LYMPH NODES: No lymphadenopathy.    ABDOMINAL WALL: Within normal limits.  BONES: Mild degenerative changes.    IMPRESSION:     Normal appendix.    No urolithiasis or diverticulitis.

## 2019-09-10 NOTE — H&P ADULT - ATTENDING COMMENTS
I have personally seen, examined, and participated in the care of this patient.  I have reviewed all pertinent clinical information, including history, physical exam, plan, and the resident's note and agree except as noted.    #Sepsis syndrome-likely GI/intraabdominal source - possibly diverticulitis  -Broad abx: vanc/zosyn to start  -GI pcr.  F/u bCx.  UA unremarkable  -Trend lactate.  IVF  #Abdominal pain-TVUS and CT a/p without obvious pathology.  Lipase negative  -GI pcr for diarrhea.

## 2019-09-10 NOTE — H&P ADULT - NSHPSOCIALHISTORY_GEN_ALL_CORE
Pt lives at home with her  and children. 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 No hx of alcohol, tobacco, illicit drug use.

## 2019-09-10 NOTE — H&P ADULT - ASSESSMENT
Pt is a 44 yo F w/ PMHx DM2, hypothyroidism, ovarian cysts, presenting with LLQ pain for one week. Pelvic US unremarkable. CT A/P negative for diverticulitis or nephrolithiasis; however does demonstrate numerous subcentimeter short axis mesenteric lymph nodes Pt is a 42 yo F w/ PMHx DM2, hypothyroidism, ovarian cysts, presenting with LLQ pain for one week. Pt febrile and leukocytic in ED with suspicion for intra-abdominal infection. Pelvic US unremarkable. CT A/P negative for diverticulitis or nephrolithiasis; however does demonstrate numerous subcentimeter short axis mesenteric lymph nodes.

## 2019-09-10 NOTE — H&P ADULT - NSICDXPASTMEDICALHX_GEN_ALL_CORE_FT
PAST MEDICAL HISTORY:  Diabetes     Hypothyroidism, unspecified type     Uterine leiomyoma, unspecified location

## 2019-09-10 NOTE — H&P ADULT - HISTORY OF PRESENT ILLNESS
Pt is a 44 yo F w/ PMHx DM2, hypothyroidism, ovarian cysts,     Pt had pelvic US on 9/5 which was unremarkable.    In the ED: VS Tmax 102.2 oral, -132, /78, RR 20 100% SpO2. WBCs 13.23 w/ neutrophil predominance, VBG 7.5/29/31/24, lactate 3.7, HST<6. lipase 32.9. UA negative. No urgent findings on abdominal xray. Transvaginal doppler US normal. CT A/P noncon showed numerous subcentimeter short axis mesenteric lymph nodes. S/p zosyn and 3L NS Pt is a 44 yo F w/ PMHx DM2, hypothyroidism, ovarian cysts, presenting with LLQ pain for one week. Pt states she returned from [place] on September 1st and shortly after started to notice worsening abdominal distension as well as intermittent LLQ pain. Her GYN Dr. Zimmerman sent her for a pelvic US on 9/5 which was unremarkable. On 9/8, the pain started acutely worsening and was accompanied by chills and multiple episodes of nbnb vomiting, which brought the patient to the ED. Today, she also developed chest pain and shortness of breath in the ED which have now resolved. She denies hematuria, hematochezia, melena, diarrhea, cough. Pt endorses recent intentional weight loss.       In the ED: VS Tmax 102.2 oral, -132, /78, RR 20 100% SpO2. WBCs 13.23 w/ neutrophil predominance, VBG 7.5/29/31/24, lactate 3.7, HST<6. lipase 32.9.  EKG NSR. UA negative. No urgent findings on abdominal xray. Transvaginal doppler US normal. CT A/P noncon showed numerous subcentimeter short axis mesenteric lymph nodes. S/p zosyn and 3L NS. Pt had an episode of watery diarrhea in the ED. S/p zosyn, morphine 4 mg x3, ketorolac 15 mg. Pt is a 44 yo F w/ PMHx DM2, hypothyroidism, ovarian cysts, presenting with LLQ pain for one week. Pt states she returned from [place] on September 1st and shortly after started to notice worsening abdominal distension as well as intermittent sharp LLQ pain. Her GYN Dr. Zimmeramn sent her for a pelvic US on 9/5 which was unremarkable. On 9/8, the pain started acutely worsening and was accompanied by chills and multiple episodes of nbnb vomiting, which brought the patient to the ED. Today, she also developed chest pain and shortness of breath in the ED which have now resolved. She denies hematuria, hematochezia, melena, diarrhea, cough. Pt endorses recent intentional weight loss.       In the ED: VS Tmax 102.2 oral, -132, /78, RR 20 100% SpO2. WBCs 13.23 w/ neutrophil predominance, VBG 7.5/29/31/24, lactate 3.7, HST<6. lipase 32.9.  EKG NSR. UA negative. No urgent findings on abdominal xray. Transvaginal doppler US normal. CT A/P noncon showed numerous subcentimeter short axis mesenteric lymph nodes. S/p zosyn and 3L NS, morphine 4 mg IVP x3, ketorolac 15 mg. Pt had an episode of watery diarrhea in the ED.

## 2019-09-10 NOTE — H&P ADULT - NSHPREVIEWOFSYSTEMS_GEN_ALL_CORE
REVIEW OF SYSTEMS:    CONSTITUTIONAL: No weakness, fatigue, malaise, fevers or chills, no weight change, appetite change  EYES: No visual changes; No double vision,  No vertigo, eye pain  Ears: no otalgia, no otorrhea, no hearing loss, tinnitus  Nose: no epistaxis, rhinorrhea, post-discharge, sinus pressure  Throat: no throat pain, no oral lesions, tooth pain   NECK: No pain or stiffness  RESPIRATORY: No cough (productive or dry), wheezing, hemoptysis; No shortness of breath, orthopnea, RUELAS   CARDIOVASCULAR: No chest pain or palpitations, no leg edema, no claudication    GASTROINTESTINAL: No abdominal or epigastric pain. No nausea, vomiting, or hematemesis; No diarrhea or constipation. No melena or hematochezia.  GENITOURINARY: No dysuria, frequency, urgency or hematuria, no pelvic pain, urinary incontinence, urgency  Musculoskeletal: no joints or muscle pain, no swelling in joints or muscles  NEUROLOGICAL: No numbness or weakness, headache, memory loss, seizures, dizziness, vertigo, syncope, ataxia  SKIN: No pruritis, rashes, lesions or new moles  Psych: No anxiety, sadness, insomnia, suicide thoughts  Endocrine: No Heat or Cold intolerance, polydipsia, polyphagia  Heme/Lymph: no LN enlargement, no easy bruising or bleeding REVIEW OF SYSTEMS:    CONSTITUTIONAL: + chills weakness, intentional weight change   EYES: No visual changes, no eye pain   Ears: no otalgia, no otorrhea, no hearing loss   Nose: no rhinorrhea  Throat: no throat pain, no oral lesions   NECK: No pain or stiffness  RESPIRATORY: +SOB. No cough (productive or dry), wheezing, hemoptysis    CARDIOVASCULAR: + chest pain. no palpitations, no leg edema, no claudication    GASTROINTESTINAL: + LLQ abdominal  pain. No nausea, vomiting, or hematemesis; No diarrhea or constipation. No melena or hematochezia.  GENITOURINARY: No dysuria, frequency, urgency or hematuria, no pelvic pain, urinary incontinence, urgency  Musculoskeletal: no joints or muscle pain, no swelling in joints or muscles  NEUROLOGICAL: No numbness or weakness, headache, dizziness   SKIN: No pruritis, rashes, lesions   Psych: No anxiety, sadness   Endocrine: No Heat or Cold intolerance   Heme/Lymph: no LN enlargement, no easy bruising or bleeding

## 2019-09-10 NOTE — H&P ADULT - PROBLEM SELECTOR PLAN 1
Pt presenting with LLQ abdominal pain.   - Pelvic US unremarkable so ovarian cyst less likely.  - cystitis unlikely as UA negative  - CT A/P noncon shows no diverticulitis or nephrolithiasis; however does demonstrate numerous subcentimeter short axis mesenteric lymph nodes  - pt had episode of watery diarrhea in ED. If continues, consider GI PCR   - c/w zosyn Pt presenting with LLQ abdominal pain.   - Pelvic US unremarkable so ovarian cyst less likely.  - cystitis unlikely as UA negative  - CT A/P noncon shows no diverticulitis or nephrolithiasis; however does demonstrate numerous subcentimeter short axis mesenteric lymph nodes  - pt had episode of watery diarrhea in ED. If continues, consider GI PCR   - c/w zosyn, vanc  - c/w morphine 4 mg IV q4 Pt presenting with LLQ abdominal pain.   - Pelvic US unremarkable so ovarian cyst less likely.  - cystitis unlikely as UA negative  - CT A/P noncon shows no diverticulitis or nephrolithiasis; however does demonstrate numerous subcentimeter short axis mesenteric lymph nodes  -Possibly diverticulitis not visible on CT  - pt had episode of watery diarrhea in ED. check GI PCR  (no recent abx to increase risk for c-diff)  - c/w zosyn, vanc  - c/w morphine 4 mg IV q4, apap prn

## 2019-09-10 NOTE — PROGRESS NOTE ADULT - ASSESSMENT
Pt is a 44 yo F w/ PMHx DM2, hypothyroidism, ovarian cysts, presenting with LLQ pain for one week. Pt febrile and leukocytic in ED with suspicion for intra-abdominal infection. Pelvic US unremarkable. CT A/P negative for diverticulitis or nephrolithiasis; however does demonstrate numerous subcentimeter short axis mesenteric lymph nodes.

## 2019-09-10 NOTE — H&P ADULT - PROBLEM SELECTOR PLAN 2
Pt meets criteria on account of leukocytosis, fever. Lactate elevated to 3.7. Unclear source at this time.  - f/u Bcx  - f/u procalcitonin  - f/u repeat lactate Pt meets criteria on account of leukocytosis, fever. Lactate elevated to 3.7. suspected source is intraabdominal or GI based on LLQ pain and diarrhea  -s/p 3 L.  No respiratory issues - give an additional 1 L  - f/u Bcx  - f/u procalcitonin  - f/u repeat lactate

## 2019-09-10 NOTE — H&P ADULT - NSHPPHYSICALEXAM_GEN_ALL_CORE
PHYSICAL EXAM:  GENERAL: NAD, well-groomed, well-developed  HEAD:  Atraumatic, Normocephalic  EYES: EOMI, PERRLA, conjunctiva and sclera clear  ENMT: No tonsillar erythema, exudates, or enlargement; Moist mucous membranes  NECK: Supple, No JVD, Normal thyroid  HEART: Regular rate and rhythm; No murmurs, rubs, or gallops  RESPIRATORY: CTA B/L, No W/R/R  ABDOMEN: Soft, Nontender, Nondistended; Bowel sounds present  NEUROLOGY: A&Ox3, nonfocal, moving all extremities  EXTREMITIES:  2+ Peripheral Pulses, No clubbing, cyanosis, or edema  SKIN: warm, dry, normal color, no rash or abnormal lesions PHYSICAL EXAM:  GENERAL: pt appears uncomfortable, rigoring  HEAD:  Atraumatic, Normocephalic  EYES: EOMI, PERRLA, conjunctiva and sclera clear  ENMT: No tonsillar erythema, exudates, or enlargement; Moist mucous membranes  NECK: Supple   HEART: Regular rate and rhythm; No murmurs, rubs, or gallops  RESPIRATORY: CTA B/L, No W/R/R  ABDOMEN: Soft, tender to palpation in LLQ, distended  NEUROLOGY: A&Ox3, nonfocal, moving all extremities  EXTREMITIES:  2+ Peripheral Pulses, No clubbing, cyanosis, or edema  SKIN: warm, dry, normal color, no rash or abnormal lesions PHYSICAL EXAM:  GENERAL: pt appears uncomfortable, rigoring  HEAD: Atraumatic, Normocephalic  EYES: EOMI, PERRLA, conjunctiva and sclera clear  ENMT: No tonsillar erythema, exudates, or enlargement; Moist mucous membranes  NECK: Supple   HEART: tachycardic, regular rhythm; No murmurs, rubs, or gallops  RESPIRATORY: CTA B/L, No W/R/R  ABDOMEN: Soft, tender to palpation in LLQ, distended  NEUROLOGY: A&Ox3, nonfocal, moving all extremities  EXTREMITIES:  2+ Peripheral Pulses, No clubbing, cyanosis, or edema  SKIN: warm, dry, normal color, no rash or abnormal lesions PHYSICAL EXAM:  GENERAL/constitutional: pt appears uncomfortable, rigoring, VS reviewed and febrile to 102.2, -130, bp 120-130/60-70, RR17-20, bp %  HEAD: Atraumatic, Normocephalic  EYES: EOMI, PERRLA, conjunctiva and sclera clear  ENMT: No tonsillar erythema, exudates, or enlargement; Moist mucous membranes  NECK: Supple   HEART: tachycardic, regular rhythm; No murmurs, rubs, or gallops  RESPIRATORY: CTA B/L, No W/R/R  ABDOMEN: Soft, tender to palpation in LLQ, distended, no rebound/guarding  NEUROLOGY: A&Ox3, nonfocal, moving all extremities  EXTREMITIES:  2+ Peripheral Pulses, No clubbing, cyanosis, or edema  SKIN: warm, dry, normal color, no rash or abnormal lesions

## 2019-09-10 NOTE — H&P ADULT - PROBLEM SELECTOR PLAN 4
Pt on Januvia at home  - ISS before meals and at bedtime Pt on Januvia at home.  Hold oral meds while inpt  - ISS before meals and at bedtime

## 2019-09-11 LAB
ALBUMIN SERPL ELPH-MCNC: 3.7 G/DL — SIGNIFICANT CHANGE UP (ref 3.3–5)
ALBUMIN SERPL ELPH-MCNC: 4.1 G/DL — SIGNIFICANT CHANGE UP (ref 3.3–5)
ALP SERPL-CCNC: 54 U/L — SIGNIFICANT CHANGE UP (ref 40–120)
ALP SERPL-CCNC: 55 U/L — SIGNIFICANT CHANGE UP (ref 40–120)
ALT FLD-CCNC: 27 U/L — SIGNIFICANT CHANGE UP (ref 4–33)
ALT FLD-CCNC: 28 U/L — SIGNIFICANT CHANGE UP (ref 4–33)
ANION GAP SERPL CALC-SCNC: 10 MMO/L — SIGNIFICANT CHANGE UP (ref 7–14)
ANION GAP SERPL CALC-SCNC: 16 MMO/L — HIGH (ref 7–14)
AST SERPL-CCNC: 15 U/L — SIGNIFICANT CHANGE UP (ref 4–32)
AST SERPL-CCNC: 18 U/L — SIGNIFICANT CHANGE UP (ref 4–32)
B-OH-BUTYR SERPL-SCNC: 0.4 MMOL/L — SIGNIFICANT CHANGE UP (ref 0–0.4)
BACTERIA UR CULT: SIGNIFICANT CHANGE UP
BASOPHILS # BLD AUTO: 0.02 K/UL — SIGNIFICANT CHANGE UP (ref 0–0.2)
BASOPHILS # BLD AUTO: 0.02 K/UL — SIGNIFICANT CHANGE UP (ref 0–0.2)
BASOPHILS NFR BLD AUTO: 0.3 % — SIGNIFICANT CHANGE UP (ref 0–2)
BASOPHILS NFR BLD AUTO: 0.3 % — SIGNIFICANT CHANGE UP (ref 0–2)
BILIRUB SERPL-MCNC: 0.4 MG/DL — SIGNIFICANT CHANGE UP (ref 0.2–1.2)
BILIRUB SERPL-MCNC: 0.5 MG/DL — SIGNIFICANT CHANGE UP (ref 0.2–1.2)
BUN SERPL-MCNC: 4 MG/DL — LOW (ref 7–23)
BUN SERPL-MCNC: 4 MG/DL — LOW (ref 7–23)
CALCIUM SERPL-MCNC: 8.5 MG/DL — SIGNIFICANT CHANGE UP (ref 8.4–10.5)
CALCIUM SERPL-MCNC: 8.8 MG/DL — SIGNIFICANT CHANGE UP (ref 8.4–10.5)
CHLORIDE SERPL-SCNC: 102 MMOL/L — SIGNIFICANT CHANGE UP (ref 98–107)
CHLORIDE SERPL-SCNC: 104 MMOL/L — SIGNIFICANT CHANGE UP (ref 98–107)
CO2 SERPL-SCNC: 20 MMOL/L — LOW (ref 22–31)
CO2 SERPL-SCNC: 23 MMOL/L — SIGNIFICANT CHANGE UP (ref 22–31)
CREAT SERPL-MCNC: 0.48 MG/DL — LOW (ref 0.5–1.3)
CREAT SERPL-MCNC: 0.6 MG/DL — SIGNIFICANT CHANGE UP (ref 0.5–1.3)
EOSINOPHIL # BLD AUTO: 0.04 K/UL — SIGNIFICANT CHANGE UP (ref 0–0.5)
EOSINOPHIL # BLD AUTO: 0.18 K/UL — SIGNIFICANT CHANGE UP (ref 0–0.5)
EOSINOPHIL NFR BLD AUTO: 0.5 % — SIGNIFICANT CHANGE UP (ref 0–6)
EOSINOPHIL NFR BLD AUTO: 2.4 % — SIGNIFICANT CHANGE UP (ref 0–6)
GI PCR PANEL, STOOL: SIGNIFICANT CHANGE UP
GLUCOSE BLDC GLUCOMTR-MCNC: 137 MG/DL — HIGH (ref 70–99)
GLUCOSE BLDC GLUCOMTR-MCNC: 178 MG/DL — HIGH (ref 70–99)
GLUCOSE BLDC GLUCOMTR-MCNC: 214 MG/DL — HIGH (ref 70–99)
GLUCOSE BLDC GLUCOMTR-MCNC: 216 MG/DL — HIGH (ref 70–99)
GLUCOSE SERPL-MCNC: 157 MG/DL — HIGH (ref 70–99)
GLUCOSE SERPL-MCNC: 199 MG/DL — HIGH (ref 70–99)
HBA1C BLD-MCNC: 8.6 % — HIGH (ref 4–5.6)
HCT VFR BLD CALC: 36.6 % — SIGNIFICANT CHANGE UP (ref 34.5–45)
HCT VFR BLD CALC: 37.1 % — SIGNIFICANT CHANGE UP (ref 34.5–45)
HGB BLD-MCNC: 11.3 G/DL — LOW (ref 11.5–15.5)
HGB BLD-MCNC: 11.8 G/DL — SIGNIFICANT CHANGE UP (ref 11.5–15.5)
IMM GRANULOCYTES NFR BLD AUTO: 0.7 % — SIGNIFICANT CHANGE UP (ref 0–1.5)
IMM GRANULOCYTES NFR BLD AUTO: 0.7 % — SIGNIFICANT CHANGE UP (ref 0–1.5)
LYMPHOCYTES # BLD AUTO: 1.55 K/UL — SIGNIFICANT CHANGE UP (ref 1–3.3)
LYMPHOCYTES # BLD AUTO: 2.22 K/UL — SIGNIFICANT CHANGE UP (ref 1–3.3)
LYMPHOCYTES # BLD AUTO: 20.8 % — SIGNIFICANT CHANGE UP (ref 13–44)
LYMPHOCYTES # BLD AUTO: 29.8 % — SIGNIFICANT CHANGE UP (ref 13–44)
MAGNESIUM SERPL-MCNC: 2 MG/DL — SIGNIFICANT CHANGE UP (ref 1.6–2.6)
MAGNESIUM SERPL-MCNC: 2.1 MG/DL — SIGNIFICANT CHANGE UP (ref 1.6–2.6)
MCHC RBC-ENTMCNC: 26.2 PG — LOW (ref 27–34)
MCHC RBC-ENTMCNC: 27.3 PG — SIGNIFICANT CHANGE UP (ref 27–34)
MCHC RBC-ENTMCNC: 30.9 % — LOW (ref 32–36)
MCHC RBC-ENTMCNC: 31.8 % — LOW (ref 32–36)
MCV RBC AUTO: 84.7 FL — SIGNIFICANT CHANGE UP (ref 80–100)
MCV RBC AUTO: 85.9 FL — SIGNIFICANT CHANGE UP (ref 80–100)
MONOCYTES # BLD AUTO: 0.57 K/UL — SIGNIFICANT CHANGE UP (ref 0–0.9)
MONOCYTES # BLD AUTO: 0.71 K/UL — SIGNIFICANT CHANGE UP (ref 0–0.9)
MONOCYTES NFR BLD AUTO: 7.6 % — SIGNIFICANT CHANGE UP (ref 2–14)
MONOCYTES NFR BLD AUTO: 9.5 % — SIGNIFICANT CHANGE UP (ref 2–14)
NEUTROPHILS # BLD AUTO: 4.26 K/UL — SIGNIFICANT CHANGE UP (ref 1.8–7.4)
NEUTROPHILS # BLD AUTO: 5.23 K/UL — SIGNIFICANT CHANGE UP (ref 1.8–7.4)
NEUTROPHILS NFR BLD AUTO: 57.3 % — SIGNIFICANT CHANGE UP (ref 43–77)
NEUTROPHILS NFR BLD AUTO: 70.1 % — SIGNIFICANT CHANGE UP (ref 43–77)
NRBC # FLD: 0 K/UL — SIGNIFICANT CHANGE UP (ref 0–0)
NRBC # FLD: 0 K/UL — SIGNIFICANT CHANGE UP (ref 0–0)
PHOSPHATE SERPL-MCNC: 2.1 MG/DL — LOW (ref 2.5–4.5)
PHOSPHATE SERPL-MCNC: 2.9 MG/DL — SIGNIFICANT CHANGE UP (ref 2.5–4.5)
PLATELET # BLD AUTO: 217 K/UL — SIGNIFICANT CHANGE UP (ref 150–400)
PLATELET # BLD AUTO: 231 K/UL — SIGNIFICANT CHANGE UP (ref 150–400)
PMV BLD: 9.8 FL — SIGNIFICANT CHANGE UP (ref 7–13)
PMV BLD: 9.9 FL — SIGNIFICANT CHANGE UP (ref 7–13)
POTASSIUM SERPL-MCNC: 3.7 MMOL/L — SIGNIFICANT CHANGE UP (ref 3.5–5.3)
POTASSIUM SERPL-MCNC: 3.9 MMOL/L — SIGNIFICANT CHANGE UP (ref 3.5–5.3)
POTASSIUM SERPL-SCNC: 3.7 MMOL/L — SIGNIFICANT CHANGE UP (ref 3.5–5.3)
POTASSIUM SERPL-SCNC: 3.9 MMOL/L — SIGNIFICANT CHANGE UP (ref 3.5–5.3)
PROT SERPL-MCNC: 7 G/DL — SIGNIFICANT CHANGE UP (ref 6–8.3)
PROT SERPL-MCNC: 7.5 G/DL — SIGNIFICANT CHANGE UP (ref 6–8.3)
RBC # BLD: 4.32 M/UL — SIGNIFICANT CHANGE UP (ref 3.8–5.2)
RBC # BLD: 4.32 M/UL — SIGNIFICANT CHANGE UP (ref 3.8–5.2)
RBC # FLD: 12.5 % — SIGNIFICANT CHANGE UP (ref 10.3–14.5)
RBC # FLD: 12.6 % — SIGNIFICANT CHANGE UP (ref 10.3–14.5)
SODIUM SERPL-SCNC: 137 MMOL/L — SIGNIFICANT CHANGE UP (ref 135–145)
SODIUM SERPL-SCNC: 138 MMOL/L — SIGNIFICANT CHANGE UP (ref 135–145)
SPECIMEN SOURCE: SIGNIFICANT CHANGE UP
WBC # BLD: 7.44 K/UL — SIGNIFICANT CHANGE UP (ref 3.8–10.5)
WBC # BLD: 7.46 K/UL — SIGNIFICANT CHANGE UP (ref 3.8–10.5)
WBC # FLD AUTO: 7.44 K/UL — SIGNIFICANT CHANGE UP (ref 3.8–10.5)
WBC # FLD AUTO: 7.46 K/UL — SIGNIFICANT CHANGE UP (ref 3.8–10.5)

## 2019-09-11 PROCEDURE — 74018 RADEX ABDOMEN 1 VIEW: CPT | Mod: 26

## 2019-09-11 PROCEDURE — 99233 SBSQ HOSP IP/OBS HIGH 50: CPT

## 2019-09-11 PROCEDURE — 71045 X-RAY EXAM CHEST 1 VIEW: CPT | Mod: 26

## 2019-09-11 PROCEDURE — 93010 ELECTROCARDIOGRAM REPORT: CPT

## 2019-09-11 RX ORDER — SODIUM CHLORIDE 9 MG/ML
1000 INJECTION INTRAMUSCULAR; INTRAVENOUS; SUBCUTANEOUS
Refills: 0 | Status: DISCONTINUED | OUTPATIENT
Start: 2019-09-11 | End: 2019-09-12

## 2019-09-11 RX ORDER — IBUPROFEN 200 MG
600 TABLET ORAL ONCE
Refills: 0 | Status: COMPLETED | OUTPATIENT
Start: 2019-09-11 | End: 2019-09-13

## 2019-09-11 RX ORDER — KETOROLAC TROMETHAMINE 30 MG/ML
15 SYRINGE (ML) INJECTION ONCE
Refills: 0 | Status: DISCONTINUED | OUTPATIENT
Start: 2019-09-11 | End: 2019-09-13

## 2019-09-11 RX ADMIN — PIPERACILLIN AND TAZOBACTAM 25 GRAM(S): 4; .5 INJECTION, POWDER, LYOPHILIZED, FOR SOLUTION INTRAVENOUS at 12:12

## 2019-09-11 RX ADMIN — Medication 650 MILLIGRAM(S): at 02:20

## 2019-09-11 RX ADMIN — SODIUM CHLORIDE 50 MILLILITER(S): 9 INJECTION INTRAMUSCULAR; INTRAVENOUS; SUBCUTANEOUS at 22:30

## 2019-09-11 RX ADMIN — Medication 2: at 17:10

## 2019-09-11 RX ADMIN — PIPERACILLIN AND TAZOBACTAM 25 GRAM(S): 4; .5 INJECTION, POWDER, LYOPHILIZED, FOR SOLUTION INTRAVENOUS at 02:21

## 2019-09-11 RX ADMIN — Medication 175 MICROGRAM(S): at 05:46

## 2019-09-11 RX ADMIN — Medication 30 MILLILITER(S): at 13:42

## 2019-09-11 RX ADMIN — Medication 1: at 08:25

## 2019-09-11 RX ADMIN — Medication 650 MILLIGRAM(S): at 03:18

## 2019-09-11 RX ADMIN — PIPERACILLIN AND TAZOBACTAM 25 GRAM(S): 4; .5 INJECTION, POWDER, LYOPHILIZED, FOR SOLUTION INTRAVENOUS at 18:14

## 2019-09-11 RX ADMIN — Medication 2: at 12:11

## 2019-09-11 NOTE — PROGRESS NOTE ADULT - PROBLEM SELECTOR PLAN 5
DVT ppx: Improve score low, none indicated  Diet: consistent carbohydrates  full code    Stephy Davis MD: Patient evaluated and interviewed with MS[facility]. Agree with above.

## 2019-09-11 NOTE — PROGRESS NOTE ADULT - PROBLEM SELECTOR PLAN 4
Pt on Januvia at home.  Hold oral meds while inpt  - ISS before meals and at bedtime HbA1c = 8.6%  Pt on Januvia at home.  Hold oral meds while inpt  - ISS before meals and at bedtime

## 2019-09-11 NOTE — PROGRESS NOTE ADULT - PROBLEM SELECTOR PLAN 1
Pt presenting with LLQ abdominal pain.   - Pelvic US unremarkable so ovarian cyst less likely.  - cystitis unlikely as UA negative  - CT A/P noncon shows no diverticulitis or nephrolithiasis; however does demonstrate numerous subcentimeter short axis mesenteric lymph nodes  -Possibly diverticulitis not visible on CT  - pt had episode of watery diarrhea in ED. check GI PCR  (no recent abx to increase risk for c-diff)  - c/w zosyn  - c/w apap prn Abdominal pain much improved overnight. Patient notes 6 bowel movements.  - f/u GI-PCR. If Viral etiology, or negative, observe for another day for spike in fevers. If afebrile, consider discharge. If patient spikes fever throughout the day, in the setting of a negative GI-PCR, consider ID consult.  -continue with Zosyn until bcx and GI-PCR return

## 2019-09-11 NOTE — PROGRESS NOTE ADULT - ASSESSMENT
Pt is a 42 yo F w/ PMHx DM2, hypothyroidism, ovarian cysts, presenting with LLQ pain for one week. Pt febrile and leukocytic in ED with suspicion for intra-abdominal infection. Pelvic US unremarkable. CT A/P negative for diverticulitis or nephrolithiasis; however does demonstrate numerous subcentimeter short axis mesenteric lymph nodes.

## 2019-09-11 NOTE — PROGRESS NOTE ADULT - PROBLEM SELECTOR PLAN 2
Pt meets criteria on account of leukocytosis, fever. Lactate elevated to 3.7. suspected source is intraabdominal or GI based on LLQ pain and diarrhea  -s/p 3 L.  No respiratory issues - give an additional 1 L  - f/u Bcx - No growth to date  - f/u procalcitonin - 0.08 (<0.5 Risk for sepsis low)  - Consult ID for further workup and antibiotic tailoring  - f/u repeat lactate - most recent 2.9, down from 3.7  - Strict I/Os for insensible losses secondary to diarrhea and increased respiratory rate Pt meets criteria on account of leukocytosis, fever. Lactate elevated to 3.7 on admission, but now down to 2.9. Suspected source is intraabdominal or GI based on LLQ pain and diarrhea  -s/p 3 L.  No respiratory issues - give an additional 1 L  - f/u Bcx - No growth to date  - f/u procalcitonin - 0.08 (<0.5 Risk for sepsis low)  - Consider consult ID for further workup and antibiotic tailoring if patient continues to spike fever in setting of negative bcx and GI-PCR  - Strict I/Os for insensible losses secondary to diarrhea and increased respiratory rate

## 2019-09-11 NOTE — PROGRESS NOTE ADULT - SUBJECTIVE AND OBJECTIVE BOX
SUBJECTIVE:          OBJECTIVE:    Vital Signs Last 24 Hrs  T(C): 37.1 (11 Sep 2019 05:44), Max: 39.4 (10 Sep 2019 17:58)  T(F): 98.8 (11 Sep 2019 05:44), Max: 102.9 (10 Sep 2019 17:58)  HR: 101 (11 Sep 2019 05:44) (101 - 121)  BP: 103/65 (11 Sep 2019 05:44) (103/65 - 142/73)  BP(mean): --  RR: 17 (11 Sep 2019 05:44) (17 - 19)  SpO2: 99% (11 Sep 2019 05:44) (99% - 100%)    GENERAL: NAD, well-developed  HEAD:  Atraumatic, Normocephalic  EYES: EOMI, PERRLA, conjunctiva and sclera clear  NECK: Supple, No JVD  CHEST/LUNG: Clear to auscultation bilaterally; No wheeze  HEART: Regular rate and rhythm; No murmurs, rubs, or gallops  ABDOMEN: Soft, Nontender, Nondistended; Bowel sounds present  EXTREMITIES:  2+ Peripheral Pulses, No clubbing, cyanosis, or edema  PSYCH: AAOx3  NEUROLOGY: non-focal  SKIN: No rashes or lesions                          11.3   7.46  )-----------( 217      ( 11 Sep 2019 06:26 )             36.6       09-11    137  |  104  |  4<L>  ----------------------------<  199<H>  3.9   |  23  |  0.60    Ca    8.5      11 Sep 2019 06:26  Phos  2.9     09-11  Mg     2.0     09-11    TPro  7.0  /  Alb  3.7  /  TBili  0.5  /  DBili  x   /  AST  15  /  ALT  27  /  AlkPhos  54  09-11    Lactate, Blood: 2.9 mmol/L (09.10.19 @ 12:15)    Procalcitonin, Serum: 0.08: Procalcitonin (PCT) Interpretation (ng/mL) - Diagnosis of systemic bacterial infection/sepsis:  PCT < 0.5: Systemic infection (sepsis) is not likely and  risk for progression to severe systemic infection is low. SUBJECTIVE:    Patient was sen at the bedside awake and alert. She endorses interrupted sleep, but a much better overall experience since being placed in a room. Her appetite is still diminished. She mentioned having six loose (not watery), brown, non-bloody bowel movements overnight since 8PM . Today she says her pain is absent at rest and she has only mild discomfort when moving about. Her last pain med (Toradol) was more than 12 hrs ago at the time of interview.   Otherwise she endorses only a mild headache with her fever spike overnight, both were relieved by Tylenol.  She denies chest pain, palpitations, nausea and vomiting, joint pains      OBJECTIVE:    Vital Signs Last 24 Hrs  T(C): 37.1 (11 Sep 2019 05:44), Max: 39.4 (10 Sep 2019 17:58)  T(F): 98.8 (11 Sep 2019 05:44), Max: 102.9 (10 Sep 2019 17:58)  HR: 101 (11 Sep 2019 05:44) (101 - 121)  BP: 103/65 (11 Sep 2019 05:44) (103/65 - 142/73)  BP(mean): --  RR: 17 (11 Sep 2019 05:44) (17 - 19)  SpO2: 99% (11 Sep 2019 05:44) (99% - 100%)    GENERAL: NAD, well-developed  HEAD:  Atraumatic, Normocephalic  EYES: EOMI, PERRLA, conjunctiva and sclera clear  NECK: Supple, No JVD  CHEST/LUNG: Clear to auscultation bilaterally; No wheeze  HEART: Regular rate and rhythm; No murmurs, rubs, or gallops  ABDOMEN: Soft, Non-tender to percussion, nontender to light palpation, Mildly tender to deep palpation in the LLQ & RLQ, Nondistended; Bowel sounds present  EXTREMITIES:  2+ Peripheral Pulses, No clubbing, cyanosis, or edema  PSYCH: AAOx3  NEUROLOGY: non-focal  SKIN: No rashes or lesions                          11.3   7.46  )-----------( 217      ( 11 Sep 2019 06:26 )             36.6       09-11    137  |  104  |  4<L>  ----------------------------<  199<H>  3.9   |  23  |  0.60    Ca    8.5      11 Sep 2019 06:26  Phos  2.9     09-11  Mg     2.0     09-11    TPro  7.0  /  Alb  3.7  /  TBili  0.5  /  DBili  x   /  AST  15  /  ALT  27  /  AlkPhos  54  09-11    Lactate, Blood: 2.9 mmol/L (09.10.19 @ 12:15)    Procalcitonin, Serum: 0.08: Procalcitonin (PCT) Interpretation (ng/mL) - Diagnosis of systemic bacterial infection/sepsis:  PCT < 0.5: Systemic infection (sepsis) is not likely and  risk for progression to severe systemic infection is low.

## 2019-09-12 ENCOUNTER — TRANSCRIPTION ENCOUNTER (OUTPATIENT)
Age: 44
End: 2019-09-12

## 2019-09-12 DIAGNOSIS — A04.5 CAMPYLOBACTER ENTERITIS: ICD-10-CM

## 2019-09-12 LAB
ALBUMIN SERPL ELPH-MCNC: 3.8 G/DL — SIGNIFICANT CHANGE UP (ref 3.3–5)
ALP SERPL-CCNC: 54 U/L — SIGNIFICANT CHANGE UP (ref 40–120)
ALT FLD-CCNC: 26 U/L — SIGNIFICANT CHANGE UP (ref 4–33)
ANION GAP SERPL CALC-SCNC: 12 MMO/L — SIGNIFICANT CHANGE UP (ref 7–14)
AST SERPL-CCNC: 16 U/L — SIGNIFICANT CHANGE UP (ref 4–32)
BASOPHILS # BLD AUTO: 0.02 K/UL — SIGNIFICANT CHANGE UP (ref 0–0.2)
BASOPHILS NFR BLD AUTO: 0.3 % — SIGNIFICANT CHANGE UP (ref 0–2)
BILIRUB SERPL-MCNC: 0.3 MG/DL — SIGNIFICANT CHANGE UP (ref 0.2–1.2)
BUN SERPL-MCNC: 4 MG/DL — LOW (ref 7–23)
CALCIUM SERPL-MCNC: 8.6 MG/DL — SIGNIFICANT CHANGE UP (ref 8.4–10.5)
CHLORIDE SERPL-SCNC: 104 MMOL/L — SIGNIFICANT CHANGE UP (ref 98–107)
CO2 SERPL-SCNC: 21 MMOL/L — LOW (ref 22–31)
CREAT SERPL-MCNC: 0.54 MG/DL — SIGNIFICANT CHANGE UP (ref 0.5–1.3)
EOSINOPHIL # BLD AUTO: 0.24 K/UL — SIGNIFICANT CHANGE UP (ref 0–0.5)
EOSINOPHIL NFR BLD AUTO: 3.2 % — SIGNIFICANT CHANGE UP (ref 0–6)
GLUCOSE BLDC GLUCOMTR-MCNC: 128 MG/DL — HIGH (ref 70–99)
GLUCOSE BLDC GLUCOMTR-MCNC: 182 MG/DL — HIGH (ref 70–99)
GLUCOSE BLDC GLUCOMTR-MCNC: 200 MG/DL — HIGH (ref 70–99)
GLUCOSE BLDC GLUCOMTR-MCNC: 241 MG/DL — HIGH (ref 70–99)
GLUCOSE BLDC GLUCOMTR-MCNC: 241 MG/DL — HIGH (ref 70–99)
GLUCOSE SERPL-MCNC: 182 MG/DL — HIGH (ref 70–99)
HCT VFR BLD CALC: 34.4 % — LOW (ref 34.5–45)
HGB BLD-MCNC: 11.1 G/DL — LOW (ref 11.5–15.5)
IMM GRANULOCYTES NFR BLD AUTO: 0.7 % — SIGNIFICANT CHANGE UP (ref 0–1.5)
LYMPHOCYTES # BLD AUTO: 2.04 K/UL — SIGNIFICANT CHANGE UP (ref 1–3.3)
LYMPHOCYTES # BLD AUTO: 27.3 % — SIGNIFICANT CHANGE UP (ref 13–44)
MAGNESIUM SERPL-MCNC: 2 MG/DL — SIGNIFICANT CHANGE UP (ref 1.6–2.6)
MCHC RBC-ENTMCNC: 26.9 PG — LOW (ref 27–34)
MCHC RBC-ENTMCNC: 32.3 % — SIGNIFICANT CHANGE UP (ref 32–36)
MCV RBC AUTO: 83.5 FL — SIGNIFICANT CHANGE UP (ref 80–100)
MONOCYTES # BLD AUTO: 0.69 K/UL — SIGNIFICANT CHANGE UP (ref 0–0.9)
MONOCYTES NFR BLD AUTO: 9.2 % — SIGNIFICANT CHANGE UP (ref 2–14)
NEUTROPHILS # BLD AUTO: 4.43 K/UL — SIGNIFICANT CHANGE UP (ref 1.8–7.4)
NEUTROPHILS NFR BLD AUTO: 59.3 % — SIGNIFICANT CHANGE UP (ref 43–77)
NRBC # FLD: 0.03 K/UL — SIGNIFICANT CHANGE UP (ref 0–0)
PHOSPHATE SERPL-MCNC: 3 MG/DL — SIGNIFICANT CHANGE UP (ref 2.5–4.5)
PLATELET # BLD AUTO: 246 K/UL — SIGNIFICANT CHANGE UP (ref 150–400)
PMV BLD: 10.6 FL — SIGNIFICANT CHANGE UP (ref 7–13)
POTASSIUM SERPL-MCNC: 3.9 MMOL/L — SIGNIFICANT CHANGE UP (ref 3.5–5.3)
POTASSIUM SERPL-SCNC: 3.9 MMOL/L — SIGNIFICANT CHANGE UP (ref 3.5–5.3)
PROT SERPL-MCNC: 7.1 G/DL — SIGNIFICANT CHANGE UP (ref 6–8.3)
RBC # BLD: 4.12 M/UL — SIGNIFICANT CHANGE UP (ref 3.8–5.2)
RBC # FLD: 12.6 % — SIGNIFICANT CHANGE UP (ref 10.3–14.5)
SODIUM SERPL-SCNC: 137 MMOL/L — SIGNIFICANT CHANGE UP (ref 135–145)
WBC # BLD: 7.47 K/UL — SIGNIFICANT CHANGE UP (ref 3.8–10.5)
WBC # FLD AUTO: 7.47 K/UL — SIGNIFICANT CHANGE UP (ref 3.8–10.5)

## 2019-09-12 PROCEDURE — 99233 SBSQ HOSP IP/OBS HIGH 50: CPT | Mod: GC

## 2019-09-12 RX ORDER — PIPERACILLIN AND TAZOBACTAM 4; .5 G/20ML; G/20ML
3.38 INJECTION, POWDER, LYOPHILIZED, FOR SOLUTION INTRAVENOUS EVERY 8 HOURS
Refills: 0 | Status: DISCONTINUED | OUTPATIENT
Start: 2019-09-12 | End: 2019-09-13

## 2019-09-12 RX ORDER — ONDANSETRON 8 MG/1
4 TABLET, FILM COATED ORAL EVERY 4 HOURS
Refills: 0 | Status: DISCONTINUED | OUTPATIENT
Start: 2019-09-12 | End: 2019-09-14

## 2019-09-12 RX ORDER — SODIUM CHLORIDE 9 MG/ML
1000 INJECTION INTRAMUSCULAR; INTRAVENOUS; SUBCUTANEOUS
Refills: 0 | Status: DISCONTINUED | OUTPATIENT
Start: 2019-09-12 | End: 2019-09-13

## 2019-09-12 RX ORDER — ACETAMINOPHEN 500 MG
2 TABLET ORAL
Qty: 0 | Refills: 0 | DISCHARGE
Start: 2019-09-12

## 2019-09-12 RX ADMIN — Medication 1 TABLET(S): at 10:36

## 2019-09-12 RX ADMIN — Medication 1: at 08:53

## 2019-09-12 RX ADMIN — SODIUM CHLORIDE 75 MILLILITER(S): 9 INJECTION INTRAMUSCULAR; INTRAVENOUS; SUBCUTANEOUS at 21:29

## 2019-09-12 RX ADMIN — Medication 175 MICROGRAM(S): at 05:29

## 2019-09-12 RX ADMIN — ONDANSETRON 4 MILLIGRAM(S): 8 TABLET, FILM COATED ORAL at 13:52

## 2019-09-12 RX ADMIN — SODIUM CHLORIDE 75 MILLILITER(S): 9 INJECTION INTRAMUSCULAR; INTRAVENOUS; SUBCUTANEOUS at 13:53

## 2019-09-12 RX ADMIN — Medication 1: at 17:26

## 2019-09-12 RX ADMIN — PIPERACILLIN AND TAZOBACTAM 25 GRAM(S): 4; .5 INJECTION, POWDER, LYOPHILIZED, FOR SOLUTION INTRAVENOUS at 03:12

## 2019-09-12 RX ADMIN — Medication 2: at 12:24

## 2019-09-12 RX ADMIN — PIPERACILLIN AND TAZOBACTAM 25 GRAM(S): 4; .5 INJECTION, POWDER, LYOPHILIZED, FOR SOLUTION INTRAVENOUS at 21:29

## 2019-09-12 NOTE — PROGRESS NOTE ADULT - PROBLEM SELECTOR PLAN 2
Pt meets criteria on account of leukocytosis, fever. Lactate elevated to 3.7 on admission, but now down to 2.9. Suspected source is intraabdominal or GI based on LLQ pain and diarrhea  -s/p 3 L.  No respiratory issues - give an additional 1 L  - f/u Bcx - No growth to date  - f/u procalcitonin - 0.08 (<0.5 Risk for sepsis low)  - Consider consult ID for further workup and antibiotic tailoring if patient continues to spike fever in setting of negative bcx and GI-PCR  - Strict I/Os for insensible losses secondary to diarrhea and increased respiratory rate RESOLVED; Pt met criteria on admission with leukocytosis, fever & elevated lactate. Source campylobacter enteritis as above. s/p IVF resuscitation.   -Treatment as above. Encourage PO intake.

## 2019-09-12 NOTE — PROGRESS NOTE ADULT - PROBLEM SELECTOR PLAN 5
DVT ppx: Improve score low, none indicated  Diet: consistent carbohydrates  full code    Stephy Davis MD: Patient evaluated and interviewed with MS4. Agree with above. DVT ppx: Improve score low, none indicated  Diet: consistent carbohydrates  full code    Kirk Chang DO: Patient evaluated and interviewed with MS4. Agree with above. DVT ppx: Improve score low, none indicated  Diet: consistent carbohydrates  full code  Disposition: likely discharge tomorrow if stool remains more formed & abdominal pain controlled.     Kirk Chang DO: Patient evaluated and interviewed with MS4. Agree with above.

## 2019-09-12 NOTE — PROGRESS NOTE ADULT - PROBLEM SELECTOR PLAN 1
Abdominal pain better in AM yesterday, but returned overnight, now mild at 1/10 at rest post Toradol in the PM.  - GI-PCR positive for Campylobacter!  - Zosyn discontinued  - Patient allergic to azithromycin and Levoquin  - Initiate augmentin for treatment. Abdominal pain better in AM yesterday, but returned overnight, now mild at 1/10 at rest post Toradol in the PM.  -Stool becoming more formed.   - GI-PCR positive for Campylobacter, stool culture sent.   - c/w Tammy (day #3) as discussed with ID.   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  -Stool becoming more formed.   - GI-PCR positive for Campylobacter, stool culture sent.   - c/w Jameesyn (day #3) as discussed with ID.   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 Recommendations by the the [employer] Academy of Allergy, Asthma and Immun[employer] are NOT to attempt desensitization for SJS, but discontinue causative agents indefinitely.  - Will plan for discharge off of antibiotics so long as patient's emesis, diarrhea, abd pain and emesis continue to improve.

## 2019-09-12 NOTE — DISCHARGE NOTE PROVIDER - HOSPITAL COURSE
Patient is a 42 yo woman that presented to the ED with abdominal pain for 1 week and 1 episode of emesis in 4 hours after having returned from a 2 week trip from [place] just prior to the onset of her symptoms. She was febrile, tachycardic and had an elevated white count on admission, and she was given a 2L bolus of saline, morphine for pain and empiric treatment with Zosyn, no vancomycin, because of extensive history of antibiotic allergies.     Blood cultures, and urine cultures have been negative to date with improvement of fever by 09/11 and diarrhea on 09/12 with fluctuations but general improvement of abdominal pain. On 09/12 GI-PCR resulted positive for campylobacter. ID was consulted and per their recommendation the patient was continued on Zosyn with plan for discharge off of antibiotics. Azithromycin and Levoquin were considered but due to SJS in past Allergy and Immunology consult noted she could not be desensitized to these medications.    Patient to follow up with Internal Medicine 1 week after discharge. Due to elevated sugars, HbA1c 8.2% in hospital and abdominal pain, she will be provided with information for outpatient offices for Endocrine and GI for discretional use upon discharge. Patient is a 44 yo woman that presented to the ED with abdominal pain for 1 week and 1 episode of emesis in 4 hours after having returned from a 2 week trip from [place] just prior to the onset of her symptoms. She was febrile, tachycardic and had an elevated white count on admission, and she was given a 2L bolus of saline, morphine for pain and empiric treatment with Zosyn, no vancomycin, because of extensive history of antibiotic allergies.     Blood cultures, and urine cultures have been negative to date with improvement of fever by 09/11 and diarrhea on 09/12 with fluctuations but general improvement of abdominal pain. On 09/12 GI-PCR resulted positive for campylobacter. ID was consulted and per their recommendation the patient was continued on Zosyn with plan for discharge off of antibiotics. Azithromycin and Levoquin were considered but due to SJS in past Allergy and Immunology consult noted she could not be desensitized to these medications.    Patient to follow up with Internal Medicine 1 week after discharge. Due to elevated sugars, HbA1c 8.6% in hospital and abdominal pain, she will be provided with information for outpatient offices for Endocrine and GI for discretional use upon discharge.

## 2019-09-12 NOTE — DISCHARGE NOTE PROVIDER - CARE PROVIDER_API CALL
Nate Herrera)  Internal Medicine  [address], [facility] [address]  [place] [address]  Phone: [phone]  Fax: [phone]  Follow Up Time:

## 2019-09-12 NOTE — CHART NOTE - NSCHARTNOTEFT_GEN_A_CORE
Night float called due to patient having worsening abdominal pain.    Per nursing, patient was having steadily worsening abdominal pain throughout the day. Of note pt had originally presented to the hospital with LLQ abdominal pain which steadily resolved with a course of antibiotics in the hospital. Earlier today the pt stated that she was feeling very well, with her pain at only a 1/10. She states she was able to ambulate and shower. However over the course of the evening the pain gradually worsened and is currently a 6/10, however it is not as bad as when she was originally admitted. The patient describes it as a crampy ache that is most painful in the LLQ but she feels it diffusely throughout her abdomen. The pain is intermittently worse, with the worst pain occuring every Night float called due to patient having worsening abdominal pain.    Per nursing, patient was having steadily worsening abdominal pain throughout the day. Of note pt had originally presented to the hospital with LLQ abdominal pain which steadily resolved with a course of antibiotics in the hospital. Earlier today the pt stated that she was feeling very well, with her pain at only a 1/10. She states she was able to ambulate and shower. However over the course of the evening the pain gradually worsened and is currently a 6/10, however it is not as bad as when she was originally admitted. The patient describes it as a crampy ache that is most painful in the LLQ but she feels it diffusely throughout her abdomen. The pain is intermittently worse, with the pain coming in waves approximately every 6-7 minutes. The patient also reports that she has been having very little appetite throughout the day and has only eaten some bread and a banana. She has had only 1 very small episode of soft stool earlier in the day and is currently passing flatus. She does endorse urinating appropriately.  She denies any other symptoms such as fever, vomiting, hematuria, dysuria or Night float called due to patient having worsening abdominal pain.    Per nursing, patient was having steadily worsening abdominal pain throughout the day. Of note pt had originally presented to the hospital with LLQ abdominal pain which steadily improved with a course of antibiotics in the hospital. Earlier today the pt stated that she was feeling very well, with her pain at only a 1/10. She states she was able to ambulate and shower. However over the course of the evening the pain gradually worsened and is currently a 6/10, however it is not as bad as when she was originally admitted. The patient describes it as a crampy ache that is most painful in the LLQ but she feels it diffusely throughout her abdomen. The pain is intermittently worse, with the pain coming in waves approximately every 6-7 minutes. The patient also reports that she has been having very little appetite throughout the day and has only eaten some bread and a banana. She has had only 1 very small episode of soft stool earlier in the day and is currently passing flatus. She states she has been urinating very little.  She denies any other symptoms such as fever, vomiting, hematuria, dysuria or lightheadedness. Brief physical exam performed.    Vitals: HR: 95  BP: 112/62  RR: 16   SaO2: 100%  Gen: NAD, alert and cooperative, appears somewhat uncomfortable  HEENT: NC/AT, EOMI  CVS: S1 and S2 present, RRR, no m/r/g  RESP: CTAB, no crackles or wheezes appreciated  ABD: BS+, distended, soft, TTP to light palpation in LLQ, TTP to deep palpation throughout, no guarding, no rebound tenderness, no masses appreciated  SKIN: warm and dry  EXT: no LE edema noted    No signs of acute abdomen noted. No emergent intervention necessary in the setting of patient's current admission. Abdominal xray ordered to check for new/acute intraabdominal pathology. With patient's history of diabetes as well as elevated glucose earlier in the day with diffuse abdominal pain, will rule out DKA with CMP and B-hydroxybutyrate. Toradol 15mg IV ordered to help with pain control. Pt instructed to notify nursing staff if her pain worsens. Will follow up on lab results and imaging. Primary team to be informed.    Daniel Green, PGY-1  Night Float, HS-1,2,3  Pager [phone] Night float called due to patient having worsening abdominal pain.    Per nursing, patient was having steadily worsening abdominal pain throughout the day. Of note pt had originally presented to the hospital with LLQ abdominal pain which steadily improved with a course of antibiotics in the hospital. Earlier today the pt stated that she was feeling very well, with her pain at only a 1/10. She states she was able to ambulate and shower. However over the course of the evening the pain gradually worsened and is currently a 6/10, however it is not as bad as when she was originally admitted. The patient describes it as a crampy ache that is most painful in the LLQ but she feels it diffusely throughout her abdomen. The pain is intermittently worse, with the pain coming in waves approximately every 6-7 minutes. The patient also reports that she has been having very little appetite throughout the day and has only eaten some bread and a banana. She has had only 1 very small episode of soft stool earlier in the day and is currently passing flatus. She states she has been urinating very little.  She denies any other symptoms such as fever, vomiting, hematuria, dysuria or lightheadedness. Brief physical exam performed.    Vitals: HR: 85  BP: 131/77  RR: 17   SaO2: 99%  Gen: NAD, alert and cooperative, appears somewhat uncomfortable  HEENT: NC/AT, EOMI  CVS: S1 and S2 present, RRR, no m/r/g  RESP: CTAB, no crackles or wheezes appreciated  ABD: BS+, distended, soft, TTP to light palpation in LLQ, TTP to deep palpation throughout, no guarding, no rebound tenderness, no masses appreciated  SKIN: warm and dry  EXT: no LE edema noted    No signs of acute abdomen noted. No emergent intervention necessary in the setting of patient's current admission. Abdominal xray ordered to check for new/acute intraabdominal pathology. With patient's history of diabetes as well as elevated glucose earlier in the day with diffuse abdominal pain, will rule out DKA with CMP and B-hydroxybutyrate. Toradol 15mg IV ordered to help with pain control. Pt instructed to notify nursing staff if her pain worsens. Will follow up on lab results and imaging. Primary team to be informed.    Daniel Green, PGY-1  Night Float, HS-1,2,3  Pager [phone]

## 2019-09-12 NOTE — PROGRESS NOTE ADULT - SUBJECTIVE AND OBJECTIVE BOX
SUBJECTIVE:    Patient was seen by the bedside, on her feet having just had a bowel movement which she mentioned was more well formed, the best since admission. She mentioned that in the night her abd pain returned with 6-7/10 intensity despite having remained a 1/10 all day. She requested Toradol  for her pain. She says her appetite has been dismal, and she had near nothing to eat yesterday after emesis post breakfast. She was initiated on IV fluids. She slept well outside of routine interruptions.  She denies fevers, chills, chest pain, palpitations, dyspnea nausea and vomiting this morning, and joint pains.      OBJECTIVE:    Vital Signs Last 24 Hrs  T(C): 36.9 (12 Sep 2019 05:28), Max: 36.9 (12 Sep 2019 05:28)  T(F): 98.5 (12 Sep 2019 05:28), Max: 98.5 (12 Sep 2019 05:28)  HR: 92 (12 Sep 2019 05:28) (85 - 95)  BP: 123/77 (12 Sep 2019 05:28) (112/62 - 131/77)  BP(mean): --  RR: 16 (12 Sep 2019 05:28) (16 - 17)  SpO2: 100% (12 Sep 2019 05:28) (99% - 100%)      GENERAL: NAD, well-developed  HEAD:  Atraumatic, Normocephalic  EYES: EOMI, PERRLA, conjunctiva and sclera clear  NECK: Supple, No JVD  CHEST/LUNG: Clear to auscultation bilaterally; No wheeze  HEART: Regular rate and rhythm; No murmurs, rubs, or gallops  ABDOMEN: Soft, Non-tender to percussion, nontender to light palpation, Mildly tender to deep palpation in the LLQ. Nondistended; Bowel sounds present  EXTREMITIES:  2+ Peripheral Pulses, No clubbing, cyanosis, or edema  PSYCH: AAOx3  NEUROLOGY: non-focal  SKIN: No rashes or lesions                          11.1   7.47  )-----------( 246      ( 12 Sep 2019 05:50 )             34.4       09-12    137  |  104  |  4<L>  ----------------------------<  182<H>  3.9   |  21<L>  |  0.54    Ca    8.6      12 Sep 2019 05:50  Phos  3.0     09-12  Mg     2.0     09-12    TPro  7.1  /  Alb  3.8  /  TBili  0.3  /  DBili  x   /  AST  16  /  ALT  26  /  AlkPhos  54  09-12    Lactate, Blood: 2.9 mmol/L (09.10.19 @ 12:15)    Procalcitonin, Serum: 0.08: Procalcitonin (PCT) Interpretation (ng/mL) - Diagnosis of systemic bacterial infection/sepsis:  PCT < 0.5: Systemic infection (sepsis) is not likely and  risk for progression to severe systemic infection is low.    GI PCR Panel, Stool:   GI panel PCR evaluates for:  Campylobacter, Plesiomonas shigelloides, Salmonella,  Yersinia enterocolitica, Vibrio, Enteroaggregative  Escherichia coli (EAEC), Enteropathogenic E. coli (EPEC),  Enterotoxigenic E. coli (ETEC) lt/st, Shiga-like  toxin-producing E. coli (STEC) stx1/stx2,  Shigella/Enteroinvasive E. coli (EIEC), Cryptosporidium,  Cyclospora cayetanensis, Entamoeba histolytica, Giardia  lamblia, Adenovirus F 40/41, Astrovirus, Norovirus GI/GII,  Rotavirus A, Sapovirus  **********************************************************  Campylobacter species  ***** CRITICAL RESULT *****  PERSON CALLED / READ-BACK: SIRI SAAVEDRA  DATE / TIME CALLED: 09/11/19 [id number]  CALLED BY: ESSENCE BYRD  TEST PERFORMED AT  [facility]  [address] [place] [address]  Phone: [phone]  Fax:   [phone]  :         DWAINE IBARRA MD  CAMS^Campylobacter species  GI PCR PANEL: DETECTED by PCR (09.10.19 @ 11:42)

## 2019-09-12 NOTE — DISCHARGE NOTE PROVIDER - NSDCCPCAREPLAN_GEN_ALL_CORE_FT
PRINCIPAL DISCHARGE DIAGNOSIS  Diagnosis: Campylobacter enteritis  Assessment and Plan of Treatment: You presented to our emergency department with fever, abdominal pain, nausea, vomiting, elevated heart rate and an elevated white blood cell count on admission, meeting criteria for sepsis. Sepsis is a constellation of changes in vital signs and labwork that indicates severe infection or inflammatory process. You were treated emprically and symptomatically with intravenous saline for hydration, Zosyn (an antibiotic) and morphine for pain. After 2 days of antibiotics and pain medication with gradual improvement of symptoms, a stool test known as GI-PCR returned positive for Campylobacter, an anaerobic bacteria that can cause an infection and precipitate diarrhea, which you developed in the hospital.  We continued you on the antibiotic regimen we had begun for an additional day, per the recommendation of the Infectious Disease team that was consulted. We are discharging you to your home without antibiotics because the course you received in the hosptal was sufficient in treating your infection. We recommend you follow up with our internal medicine team one week from discharge. We shall also be including information for our Gastroenterologists should your abdominal pain persist.      SECONDARY DISCHARGE DIAGNOSES  Diagnosis: Type 2 diabetes mellitus  Assessment and Plan of Treatment: In the hospital your blood sugars continued to be elevated despite decreased appetite and oral intake. Your HbA1c which is a test to determine the average sugar in your blood over 3 months returned elevated to 8.6%. We would like you to take metformin after your current symptoms have resolved entirely, as this medication can precipitate nausea diarrhea and we would not want to exacerbate your fluid losses. Please discuss initiating metformin with the doctor you see at your follow-up visit. You are being provided with information for our Endocrinologists for use at your discretion.

## 2019-09-12 NOTE — PROGRESS NOTE ADULT - PROBLEM SELECTOR PLAN 4
HbA1c = 8.6%  Pt on Januvia at home.  Hold oral meds while inpt  - ISS before meals and at bedtime  - Discharge on metformin with PCP follow-up and endocrine information HbA1c = 8.6%  Pt on Januvia at home.  Hold oral meds while inpt  - ISS before meals and at bedtime  - A1c 8.6%. Discharge on sitagliptin with PCP follow-up and endocrine information. Likely will require addition of metformin as outpatient once GI sx resolved.

## 2019-09-12 NOTE — PROGRESS NOTE ADULT - ASSESSMENT
Pt is a 44 yo F w/ PMHx DM2, hypothyroidism, ovarian cysts, presenting with LLQ pain for one week. Now with GI-PCR positive for campylobacter. Pt is a 44 yo F w/ PMHx DM2, hypothyroidism, ovarian cysts, presenting with LLQ pain for one week found to have campylobacter enteritis.

## 2019-09-12 NOTE — DISCHARGE NOTE PROVIDER - NSFOLLOWUPCLINICS_GEN_ALL_ED_FT
[facility] Endocrinology  Endocrinology  [address] [place] [address]  Phone: [phone]  Fax:   Follow Up Time: [facility] Endocrinology  Endocrinology  [address] [place] [address]  Phone: [phone]  Fax:     [facility] Gastroenterology  Gastroenterology  [address]  [place] [address]  Phone: [phone]  Fax:   Follow Up Time:

## 2019-09-13 LAB
ANION GAP SERPL CALC-SCNC: 12 MMO/L — SIGNIFICANT CHANGE UP (ref 7–14)
ANISOCYTOSIS BLD QL: SLIGHT — SIGNIFICANT CHANGE UP
BASOPHILS # BLD AUTO: 0.02 K/UL — SIGNIFICANT CHANGE UP (ref 0–0.2)
BASOPHILS NFR BLD AUTO: 0.3 % — SIGNIFICANT CHANGE UP (ref 0–2)
BASOPHILS NFR SPEC: 0 % — SIGNIFICANT CHANGE UP (ref 0–2)
BLASTS # FLD: 0 % — SIGNIFICANT CHANGE UP (ref 0–0)
BUN SERPL-MCNC: 5 MG/DL — LOW (ref 7–23)
CALCIUM SERPL-MCNC: 8.9 MG/DL — SIGNIFICANT CHANGE UP (ref 8.4–10.5)
CHLORIDE SERPL-SCNC: 103 MMOL/L — SIGNIFICANT CHANGE UP (ref 98–107)
CO2 SERPL-SCNC: 21 MMOL/L — LOW (ref 22–31)
CREAT SERPL-MCNC: 0.54 MG/DL — SIGNIFICANT CHANGE UP (ref 0.5–1.3)
EOSINOPHIL # BLD AUTO: 0.27 K/UL — SIGNIFICANT CHANGE UP (ref 0–0.5)
EOSINOPHIL NFR BLD AUTO: 3.6 % — SIGNIFICANT CHANGE UP (ref 0–6)
EOSINOPHIL NFR FLD: 4.4 % — SIGNIFICANT CHANGE UP (ref 0–6)
GLUCOSE BLDC GLUCOMTR-MCNC: 117 MG/DL — HIGH (ref 70–99)
GLUCOSE BLDC GLUCOMTR-MCNC: 139 MG/DL — HIGH (ref 70–99)
GLUCOSE BLDC GLUCOMTR-MCNC: 195 MG/DL — HIGH (ref 70–99)
GLUCOSE BLDC GLUCOMTR-MCNC: 201 MG/DL — HIGH (ref 70–99)
GLUCOSE SERPL-MCNC: 194 MG/DL — HIGH (ref 70–99)
HCT VFR BLD CALC: 33.4 % — LOW (ref 34.5–45)
HGB BLD-MCNC: 10.8 G/DL — LOW (ref 11.5–15.5)
IMM GRANULOCYTES NFR BLD AUTO: 0.7 % — SIGNIFICANT CHANGE UP (ref 0–1.5)
LYMPHOCYTES # BLD AUTO: 2.16 K/UL — SIGNIFICANT CHANGE UP (ref 1–3.3)
LYMPHOCYTES # BLD AUTO: 29.1 % — SIGNIFICANT CHANGE UP (ref 13–44)
LYMPHOCYTES NFR SPEC AUTO: 13 % — SIGNIFICANT CHANGE UP (ref 13–44)
MAGNESIUM SERPL-MCNC: 1.9 MG/DL — SIGNIFICANT CHANGE UP (ref 1.6–2.6)
MCHC RBC-ENTMCNC: 26.9 PG — LOW (ref 27–34)
MCHC RBC-ENTMCNC: 32.3 % — SIGNIFICANT CHANGE UP (ref 32–36)
MCV RBC AUTO: 83.3 FL — SIGNIFICANT CHANGE UP (ref 80–100)
METAMYELOCYTES # FLD: 0 % — SIGNIFICANT CHANGE UP (ref 0–1)
MICROCYTES BLD QL: SLIGHT — SIGNIFICANT CHANGE UP
MONOCYTES # BLD AUTO: 0.58 K/UL — SIGNIFICANT CHANGE UP (ref 0–0.9)
MONOCYTES NFR BLD AUTO: 7.8 % — SIGNIFICANT CHANGE UP (ref 2–14)
MONOCYTES NFR BLD: 6.1 % — SIGNIFICANT CHANGE UP (ref 2–9)
MYELOCYTES NFR BLD: 0 % — SIGNIFICANT CHANGE UP (ref 0–0)
NEUTROPHIL AB SER-ACNC: 65.2 % — SIGNIFICANT CHANGE UP (ref 43–77)
NEUTROPHILS # BLD AUTO: 4.33 K/UL — SIGNIFICANT CHANGE UP (ref 1.8–7.4)
NEUTROPHILS NFR BLD AUTO: 58.5 % — SIGNIFICANT CHANGE UP (ref 43–77)
NEUTS BAND # BLD: 0 % — SIGNIFICANT CHANGE UP (ref 0–6)
NRBC # FLD: 0 K/UL — SIGNIFICANT CHANGE UP (ref 0–0)
OTHER - HEMATOLOGY %: 0 — SIGNIFICANT CHANGE UP
PHOSPHATE SERPL-MCNC: 3.3 MG/DL — SIGNIFICANT CHANGE UP (ref 2.5–4.5)
PLATELET # BLD AUTO: 278 K/UL — SIGNIFICANT CHANGE UP (ref 150–400)
PLATELET COUNT - ESTIMATE: NORMAL — SIGNIFICANT CHANGE UP
PMV BLD: 9.9 FL — SIGNIFICANT CHANGE UP (ref 7–13)
POTASSIUM SERPL-MCNC: 4.1 MMOL/L — SIGNIFICANT CHANGE UP (ref 3.5–5.3)
POTASSIUM SERPL-SCNC: 4.1 MMOL/L — SIGNIFICANT CHANGE UP (ref 3.5–5.3)
PROMYELOCYTES # FLD: 0 % — SIGNIFICANT CHANGE UP (ref 0–0)
RBC # BLD: 4.01 M/UL — SIGNIFICANT CHANGE UP (ref 3.8–5.2)
RBC # FLD: 12.4 % — SIGNIFICANT CHANGE UP (ref 10.3–14.5)
REVIEW TO FOLLOW: YES — SIGNIFICANT CHANGE UP
SODIUM SERPL-SCNC: 136 MMOL/L — SIGNIFICANT CHANGE UP (ref 135–145)
SPECIMEN SOURCE: SIGNIFICANT CHANGE UP
VARIANT LYMPHS # BLD: 9.6 % — SIGNIFICANT CHANGE UP
WBC # BLD: 7.41 K/UL — SIGNIFICANT CHANGE UP (ref 3.8–10.5)
WBC # FLD AUTO: 7.41 K/UL — SIGNIFICANT CHANGE UP (ref 3.8–10.5)

## 2019-09-13 PROCEDURE — 99222 1ST HOSP IP/OBS MODERATE 55: CPT | Mod: GC

## 2019-09-13 PROCEDURE — 99232 SBSQ HOSP IP/OBS MODERATE 35: CPT | Mod: GC

## 2019-09-13 PROCEDURE — 99233 SBSQ HOSP IP/OBS HIGH 50: CPT | Mod: GC

## 2019-09-13 RX ORDER — KETOROLAC TROMETHAMINE 30 MG/ML
15 SYRINGE (ML) INJECTION EVERY 6 HOURS
Refills: 0 | Status: DISCONTINUED | OUTPATIENT
Start: 2019-09-13 | End: 2019-09-14

## 2019-09-13 RX ORDER — ACETAMINOPHEN 500 MG
1000 TABLET ORAL ONCE
Refills: 0 | Status: DISCONTINUED | OUTPATIENT
Start: 2019-09-13 | End: 2019-09-14

## 2019-09-13 RX ORDER — ACETAMINOPHEN 500 MG
650 TABLET ORAL EVERY 6 HOURS
Refills: 0 | Status: DISCONTINUED | OUTPATIENT
Start: 2019-09-13 | End: 2019-09-14

## 2019-09-13 RX ORDER — SODIUM CHLORIDE 9 MG/ML
1000 INJECTION INTRAMUSCULAR; INTRAVENOUS; SUBCUTANEOUS
Refills: 0 | Status: DISCONTINUED | OUTPATIENT
Start: 2019-09-13 | End: 2019-09-14

## 2019-09-13 RX ADMIN — Medication 600 MILLIGRAM(S): at 17:20

## 2019-09-13 RX ADMIN — Medication 175 MICROGRAM(S): at 06:00

## 2019-09-13 RX ADMIN — Medication 2: at 12:28

## 2019-09-13 RX ADMIN — SODIUM CHLORIDE 100 MILLILITER(S): 9 INJECTION INTRAMUSCULAR; INTRAVENOUS; SUBCUTANEOUS at 18:35

## 2019-09-13 RX ADMIN — PIPERACILLIN AND TAZOBACTAM 25 GRAM(S): 4; .5 INJECTION, POWDER, LYOPHILIZED, FOR SOLUTION INTRAVENOUS at 14:23

## 2019-09-13 RX ADMIN — Medication 1: at 09:16

## 2019-09-13 RX ADMIN — ONDANSETRON 4 MILLIGRAM(S): 8 TABLET, FILM COATED ORAL at 16:20

## 2019-09-13 RX ADMIN — PIPERACILLIN AND TAZOBACTAM 25 GRAM(S): 4; .5 INJECTION, POWDER, LYOPHILIZED, FOR SOLUTION INTRAVENOUS at 06:00

## 2019-09-13 RX ADMIN — Medication 600 MILLIGRAM(S): at 16:20

## 2019-09-13 NOTE — CONSULT NOTE ADULT - ASSESSMENT
"Dressing change/packing change to abscess on the left trunk area.  Patient had abscess drained 2 days ago.  Bandage that was applied was soaked with serosanguinous fluid that had a pungent odor.  The dressing was removed and area cleansed with Hibiclens and water.  There was still moderate amount of redness and swelling.  Patient was very sensitive to the touch stating it was still quite painful, but not as bad as yesterday.  Packing removed.  Again noted serosanguinous fluid with a foul smell.  Irrigated wound with sterile saline.  Dried area and re-packed with 1/4\" packing until resistance was met.  Covered with gauze, telfa and tegaderm.  Patient tolerated dressing change well.  Advised to follow up in 2 days.    Aicha Romero RN    " Impression:  1) Abdominal pain, diarrhea- Pt was found to have campylobacter in her stool, likely source of her symptoms  2) DM    Recommendations:  -Campylobacter is usually self limited infection; Would not treat with antibiotics as treatment is usually supportive care  -Rest of care per primary Impression:  1) Abdominal pain, diarrhea- Pt was found to have campylobacter in her stool, likely source of her symptoms  2) DM    Recommendations:  -Campylobacter is usually self limited infection; already started on zosyn by primary team  -Rest of care per primary

## 2019-09-13 NOTE — CONSULT NOTE ADULT - SUBJECTIVE AND OBJECTIVE BOX
Chief Complaint:  Patient is a 43y old  Female who presents with a chief complaint of LLQ abdominal pain (13 Sep 2019 09:00)      HPI:  44yo F PMH DM2, hypothyroidism, ovarian cyst presents with LLQ abd pain x1 week. Pt returned from [place] on  and shortly after started to notice worsening abdominal distention and intermittent sharp LLQ pain, worsening on  with chills and NBNB vomiting. Pt also endorses diarrhea x1 in the ED, and once more this admission. Pt denies melena, hematochezia, weight loss, early satiety. In the ED patient was found to have tmax of 102.2 with elevated leukocytosis (now resolved), CT on admission was unremarkable.     Pt was found to have campylobacter on GI PCR.         Allergies:  Bactrim (Swelling; Urticaria; Hives)  Biaxin (Other)  Cipro (Other)  Diflucan (Hives)  doxycycline (Unknown)  iodine (Angioedema)  Multiple abx (Unknown)      Home Medications:    Hospital Medications:  acetaminophen   Tablet .. 650 milliGRAM(s) Oral every 6 hours PRN  aluminum hydroxide/magnesium hydroxide/simethicone Suspension 30 milliLiter(s) Oral every 6 hours PRN  dextrose 40% Gel 15 Gram(s) Oral once PRN  dextrose 5%. 1000 milliLiter(s) IV Continuous <Continuous>  dextrose 50% Injectable 12.5 Gram(s) IV Push once  dextrose 50% Injectable 25 Gram(s) IV Push once  dextrose 50% Injectable 25 Gram(s) IV Push once  glucagon  Injectable 1 milliGRAM(s) IntraMuscular once PRN  ibuprofen  Tablet. 600 milliGRAM(s) Oral once  insulin lispro (HumaLOG) corrective regimen sliding scale   SubCutaneous three times a day before meals  insulin lispro (HumaLOG) corrective regimen sliding scale   SubCutaneous at bedtime  ketorolac   Injectable 15 milliGRAM(s) IV Push once PRN  levothyroxine 175 MICROGram(s) Oral daily  ondansetron Injectable 4 milliGRAM(s) IV Push every 4 hours PRN  piperacillin/tazobactam IVPB.. 3.375 Gram(s) IV Intermittent every 8 hours  sodium chloride 0.9%. 1000 milliLiter(s) IV Continuous <Continuous>      PMHX/PSHX:  Hypothyroidism, unspecified type  Diabetes  Uterine leiomyoma, unspecified location  No pertinent past medical history  H/O myomectomy  Delivered by  section  S/P endometrial ablation  No significant past surgical history      Family history:  FH: diabetes mellitus  Family history of thyroid cancer  No pertinent family history in first degree relatives      There is no family history of peptic ulcer disease, gastric cancer, colon polyps, colon cancer, celiac disease, biliary, hepatic, or pancreatic disease.  None of the female relatives have breast, uterine, or ovarian cancer.     Social History:     ROS:     General:  No wt loss, fevers, chills, night sweats, fatigue,   Eyes:  Good vision, no reported pain  ENT:  No sore throat, pain, runny nose, dysphagia  CV:  No pain, palpitations, hypo/hypertension  Resp:  No dyspnea, cough, tachypnea, wheezing  GI:  See HPI   :  No pain, bleeding, incontinence, nocturia  Muscle:  No pain, weakness  Neuro:  No weakness, tingling, memory problems  Psych:  No fatigue, insomnia, mood problems, depression  Endocrine:  No polyuria, polydipsia, cold/heat intolerance  Heme:  No petechiae, ecchymosis, easy bruisability  Skin:  No rash, tattoos, scars, edema      PHYSICAL EXAM:     GENERAL:  Appears stated age, well-groomed  HEENT:  NC/AT,  conjunctivae clear and pink, no thyromegaly  CHEST:  Full & symmetric excursion, no increased effort, breath sounds clear  HEART:  Regular rhythm, S1, S2, no murmur/rub/S3/S4, no abdominal bruit, no edema  ABDOMEN:  Soft, non-tender, non-distended, normoactive bowel sounds  EXTEREMITIES:  no cyanosis,clubbing or edema  SKIN:  No rash/erythema/ecchymoses  NEURO:  Alert, oriented, no asterixis    Vital Signs:  Vital Signs Last 24 Hrs  T(C): 36.7 (13 Sep 2019 05:59), Max: 36.9 (12 Sep 2019 20:56)  T(F): 98 (13 Sep 2019 05:59), Max: 98.5 (12 Sep 2019 20:56)  HR: 74 (13 Sep 2019 05:59) (74 - 81)  BP: 112/71 (13 Sep 2019 05:59) (112/71 - 116/69)  BP(mean): --  RR: 18 (13 Sep 2019 05:59) (18 - 18)  SpO2: 99% (13 Sep 2019 05:59) (99% - 99%)  Daily Height in cm: 170.18 (12 Sep 2019 20:56)    Daily     LABS:                        10.8   7.41  )-----------( 278      ( 13 Sep 2019 07:14 )             33.4     Mean Cell Volume: 83.3 fL (19 @ 07:14)        136  |  103  |  5<L>  ----------------------------<  194<H>  4.1   |  21<L>  |  0.54    Ca    8.9      13 Sep 2019 07:14  Phos  3.3       Mg     1.9         TPro  7.1  /  Alb  3.8  /  TBili  0.3  /  DBili  x   /  AST  16  /  ALT  26  /  AlkPhos  54  09-12    LIVER FUNCTIONS - ( 12 Sep 2019 05:50 )  Alb: 3.8 g/dL / Pro: 7.1 g/dL / ALK PHOS: 54 u/L / ALT: 26 u/L / AST: 16 u/L / GGT: x                                       10.8   7.41  )-----------( 278      ( 13 Sep 2019 07:14 )             33.4                         11.1   7.47  )-----------( 246      ( 12 Sep 2019 05:50 )             34.4                         11.8   7.44  )-----------( 231      ( 11 Sep 2019 22:35 )             37.1                         11.3   7.46  )-----------( 217      ( 11 Sep 2019 06:26 )             36.6     Imaging:      < from: CT Abdomen and Pelvis No Cont (19 @ 20:40) >  FINDINGS:    LOWER CHEST: Bibasilar subsegmental atelectasis.    LIVER: Within normal limits.  BILE DUCTS: Normalcaliber.  GALLBLADDER: Within normal limits.  SPLEEN: Within normal limits.  PANCREAS: Within normal limits.  ADRENALS: Within normal limits.  KIDNEYS/URETERS: Within normal limits.    BLADDER: Within normal limits.  REPRODUCTIVE ORGANS: Uterus and adnexa within normal limits.    BOWEL: No bowel obstruction. Appendix is not visualized.  PERITONEUM: No ascites. Numerous subcentimeter short axis mesenteric   lymph nodes  VESSELS: Within normal limits.  RETROPERITONEUM/LYMPH NODES: No lymphadenopathy.    ABDOMINAL WALL: Within normal limits.  BONES: Mild degenerative changes.    IMPRESSION:     Normal appendix.    No urolithiasis or diverticulitis.    < end of copied text > Chief Complaint:  Patient is a 43y old  Female who presents with a chief complaint of LLQ abdominal pain (13 Sep 2019 09:00)      HPI:  42yo F PMH DM2, hypothyroidism, ovarian cyst presents with LLQ abd pain x1 week. Pt returned from [place] on  and shortly after started to notice worsening abdominal distention and intermittent sharp LLQ pain, worsening on  with chills and NBNB vomiting. Pt also endorses diarrhea x1 in the ED, and once more this admission. Pt denies melena, hematochezia, weight loss, early satiety. In the ED patient was found to have tmax of 102.2 with elevated leukocytosis (now resolved), CT on admission was unremarkable.     Pt was found to have campylobacter on GI PCR.         Allergies:  Bactrim (Swelling; Urticaria; Hives)  Biaxin (Other)  Cipro (Other)  Diflucan (Hives)  doxycycline (Unknown)  iodine (Angioedema)  Multiple abx (Unknown)      Home Medications:    Hospital Medications:  acetaminophen   Tablet .. 650 milliGRAM(s) Oral every 6 hours PRN  aluminum hydroxide/magnesium hydroxide/simethicone Suspension 30 milliLiter(s) Oral every 6 hours PRN  dextrose 40% Gel 15 Gram(s) Oral once PRN  dextrose 5%. 1000 milliLiter(s) IV Continuous <Continuous>  dextrose 50% Injectable 12.5 Gram(s) IV Push once  dextrose 50% Injectable 25 Gram(s) IV Push once  dextrose 50% Injectable 25 Gram(s) IV Push once  glucagon  Injectable 1 milliGRAM(s) IntraMuscular once PRN  ibuprofen  Tablet. 600 milliGRAM(s) Oral once  insulin lispro (HumaLOG) corrective regimen sliding scale   SubCutaneous three times a day before meals  insulin lispro (HumaLOG) corrective regimen sliding scale   SubCutaneous at bedtime  ketorolac   Injectable 15 milliGRAM(s) IV Push once PRN  levothyroxine 175 MICROGram(s) Oral daily  ondansetron Injectable 4 milliGRAM(s) IV Push every 4 hours PRN  piperacillin/tazobactam IVPB.. 3.375 Gram(s) IV Intermittent every 8 hours  sodium chloride 0.9%. 1000 milliLiter(s) IV Continuous <Continuous>      PMHX/PSHX:  Hypothyroidism, unspecified type  Diabetes  Uterine leiomyoma, unspecified location  No pertinent past medical history  H/O myomectomy  Delivered by  section  S/P endometrial ablation  No significant past surgical history      Family history:  FH: diabetes mellitus  Family history of thyroid cancer  No pertinent family history in first degree relatives      There is no family history of peptic ulcer disease, gastric cancer, colon polyps, colon cancer, celiac disease, biliary, hepatic, or pancreatic disease.  None of the female relatives have breast, uterine, or ovarian cancer.     Social History:     ROS:     General:  No wt loss, fevers, chills, night sweats, fatigue,   Eyes:  Good vision, no reported pain  ENT:  No sore throat, pain, runny nose, dysphagia  CV:  No pain, palpitations, hypo/hypertension  Resp:  No dyspnea, cough, tachypnea, wheezing  GI:  See HPI   :  No pain, bleeding, incontinence, nocturia  Muscle:  No pain, weakness  Neuro:  No weakness, tingling, memory problems  Psych:  No fatigue, insomnia, mood problems, depression  Endocrine:  No polyuria, polydipsia, cold/heat intolerance  Heme:  No petechiae, ecchymosis, easy bruisability  Skin:  No rash, tattoos, scars, edema      PHYSICAL EXAM:     GENERAL:  Appears stated age, well-groomed  HEENT:  NC/AT,  conjunctivae clear and pink, no thyromegaly  CHEST:  Full & symmetric excursion, no increased effort, breath sounds clear  HEART:  Regular rhythm, S1, S2, no murmur/rub/S3/S4, no abdominal bruit, no edema  ABDOMEN:  Soft, TTP in LLQ, no guarding/rebound, non-distended, normoactive bowel sounds  EXTEREMITIES:  no cyanosis,clubbing or edema  SKIN:  No rash/erythema/ecchymoses  NEURO:  Alert, oriented, no asterixis    Vital Signs:  Vital Signs Last 24 Hrs  T(C): 36.7 (13 Sep 2019 05:59), Max: 36.9 (12 Sep 2019 20:56)  T(F): 98 (13 Sep 2019 05:59), Max: 98.5 (12 Sep 2019 20:56)  HR: 74 (13 Sep 2019 05:59) (74 - 81)  BP: 112/71 (13 Sep 2019 05:59) (112/71 - 116/69)  BP(mean): --  RR: 18 (13 Sep 2019 05:59) (18 - 18)  SpO2: 99% (13 Sep 2019 05:59) (99% - 99%)  Daily Height in cm: 170.18 (12 Sep 2019 20:56)    Daily     LABS:                        10.8   7.41  )-----------( 278      ( 13 Sep 2019 07:14 )             33.4     Mean Cell Volume: 83.3 fL (19 @ 07:14)        136  |  103  |  5<L>  ----------------------------<  194<H>  4.1   |  21<L>  |  0.54    Ca    8.9      13 Sep 2019 07:14  Phos  3.3       Mg     1.9         TPro  7.1  /  Alb  3.8  /  TBili  0.3  /  DBili  x   /  AST  16  /  ALT  26  /  AlkPhos  54  12    LIVER FUNCTIONS - ( 12 Sep 2019 05:50 )  Alb: 3.8 g/dL / Pro: 7.1 g/dL / ALK PHOS: 54 u/L / ALT: 26 u/L / AST: 16 u/L / GGT: x                                       10.8   7.41  )-----------( 278      ( 13 Sep 2019 07:14 )             33.4                         11.1   7.47  )-----------( 246      ( 12 Sep 2019 05:50 )             34.4                         11.8   7.44  )-----------( 231      ( 11 Sep 2019 22:35 )             37.1                         11.3   7.46  )-----------( 217      ( 11 Sep 2019 06:26 )             36.6     Imaging:      < from: CT Abdomen and Pelvis No Cont (19 @ 20:40) >  FINDINGS:    LOWER CHEST: Bibasilar subsegmental atelectasis.    LIVER: Within normal limits.  BILE DUCTS: Normalcaliber.  GALLBLADDER: Within normal limits.  SPLEEN: Within normal limits.  PANCREAS: Within normal limits.  ADRENALS: Within normal limits.  KIDNEYS/URETERS: Within normal limits.    BLADDER: Within normal limits.  REPRODUCTIVE ORGANS: Uterus and adnexa within normal limits.    BOWEL: No bowel obstruction. Appendix is not visualized.  PERITONEUM: No ascites. Numerous subcentimeter short axis mesenteric   lymph nodes  VESSELS: Within normal limits.  RETROPERITONEUM/LYMPH NODES: No lymphadenopathy.    ABDOMINAL WALL: Within normal limits.  BONES: Mild degenerative changes.    IMPRESSION:     Normal appendix.    No urolithiasis or diverticulitis.    < end of copied text >

## 2019-09-13 NOTE — PROGRESS NOTE ADULT - ASSESSMENT
Pt is a 42 yo F w/ PMHx DM2, hypothyroidism, ovarian cysts, presenting with LLQ pain for one week found to have campylobacter enteritis.

## 2019-09-13 NOTE — PROGRESS NOTE ADULT - PROBLEM SELECTOR PLAN 1
Abdominal pain improved throughout day yesterday, now mild at 0/10 at rest without medication.  -Stool becoming more formed 4 movements total yesterday.  - GI-PCR positive for Campylobacter, stool culture sent.   - c/w Zosyn (day #3) as discussed with ID.   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 Recommendations by the the American Academy of Allergy, Asthma and Immunology are NOT to attempt desensitization for SJS, but discontinue causative agents indefinitely.  - Will plan for discharge off of antibiotics so long as patient's emesis, diarrhea, abd pain and emesis continue to improve. Abdominal pain improved throughout day yesterday, now mild at 0/10 at rest without medication.  -Stool becoming more formed 4 movements total yesterday.  - GI-PCR positive for Campylobacter, stool culture sent.   - c/w Zosyn (day #4) as discussed with ID.   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 Recommendations by the the American Academy of Allergy, Asthma and Immunology are NOT to attempt desensitization for SJS, but discontinue causative agents indefinitely.  - Will plan for discharge off of antibiotics so long as patient's emesis, diarrhea, abd pain and emesis continue to improve.

## 2019-09-13 NOTE — PROGRESS NOTE ADULT - PROBLEM SELECTOR PLAN 4
HbA1c = 8.6%  Pt on Januvia at home.  Hold oral meds while inpt  - ISS before meals and at bedtime  - A1c 8.6%. Discharge on sitagliptin with PCP follow-up and endocrine information. Likely will require addition of metformin as outpatient once GI sx resolved.

## 2019-09-13 NOTE — PROGRESS NOTE ADULT - PROBLEM SELECTOR PLAN 2
RESOLVED; Pt met criteria on admission with leukocytosis, fever & elevated lactate. Source campylobacter enteritis as above. s/p IVF resuscitation.   -Treatment as above. Encourage PO intake.

## 2019-09-13 NOTE — PROGRESS NOTE ADULT - PROBLEM SELECTOR PLAN 5
DVT ppx: Improve score low, none indicated  Diet: consistent carbohydrates  full code  Disposition: likely discharge tomorrow if stool remains more formed & abdominal pain controlled.     Kirk Chang DO: Patient evaluated and interviewed with [facility]. Agree with above.

## 2019-09-13 NOTE — CONSULT NOTE ADULT - ATTENDING COMMENTS
GI consulted for patient with Campylobacter. Usually managed with supportive care, but given severity of symptoms, patient started on antibiotics, which was reasonable decision. Patient with multiple antibiotic allergies and per documentation, ID recommended initiation of zosyn. Overall patient improving slowly, with decreased pain, diarrhea. Still with poor PO intake, but expect that to improve as well. Continue supportive management.

## 2019-09-13 NOTE — PROGRESS NOTE ADULT - SUBJECTIVE AND OBJECTIVE BOX
SUBJECTIVE:    Patient was seen at the bedside and was aroused from sleep for interview. She endorses a restful night and denies pain or the necessity of pain medication overnight. Last episode of emesis was yesterday afternoon post-breakfast. Patient denies emesis since and is tolerating fluids PO, full diet changed to soft since yesterday. Patient endorses a single episode of cough in the AM that resolved with respiratory engagement (walking on floors). She denies shortness of breath on exertion. She had 4 bowel movements yesterday, a decrease from her 6 the day prior.  She denies fevers, chills, headache, n/v and bowel movements today, dysuria, leg weakness and joint pain.        OBJECTIVE:  Vital Signs Last 24 Hrs  T(C): 36.7 (13 Sep 2019 05:59), Max: 36.9 (12 Sep 2019 20:56)  T(F): 98 (13 Sep 2019 05:59), Max: 98.5 (12 Sep 2019 20:56)  HR: 74 (13 Sep 2019 05:59) (74 - 94)  BP: 112/71 (13 Sep 2019 05:59) (112/71 - 129/74)  BP(mean): --  RR: 18 (13 Sep 2019 05:59) (18 - 18)  SpO2: 99% (13 Sep 2019 05:59) (99% - 99%)      GENERAL: NAD, well-developed  HEAD:  Atraumatic, Normocephalic  EYES: EOMI, PERRLA, conjunctiva and sclera clear  NECK: Supple, No JVD  CHEST/LUNG: Clear to auscultation bilaterally; Good flow throughout upper and lower lung fields, no wheezes rales or rhonchi.  HEART: Regular rate and rhythm; No murmurs, rubs, or gallops  ABDOMEN: Soft, Non-tender to percussion, nontender to light palpation, Mildly tender to deep palpation in the LLQ. Nondistended; Bowel sounds present  EXTREMITIES:  2+ Peripheral Pulses, No clubbing, cyanosis, or edema  PSYCH: AAOx3  NEUROLOGY: non-focal  SKIN: No rashes or lesions                                     10.8   7.41  )-----------( 278      ( 13 Sep 2019 07:14 )             33.4       09-13    136  |  103  |  5<L>  ----------------------------<  194<H>  4.1   |  21<L>  |  0.54    Ca    8.9      13 Sep 2019 07:14  Phos  3.3     09-13  Mg     1.9     09-13    TPro  7.1  /  Alb  3.8  /  TBili  0.3  /  DBili  x   /  AST  16  /  ALT  26  /  AlkPhos  54  09-12      Procalcitonin, Serum: 0.08: Procalcitonin (PCT) Interpretation (ng/mL) - Diagnosis of systemic bacterial infection/sepsis:  PCT < 0.5: Systemic infection (sepsis) is not likely and  risk for progression to severe systemic infection is low.    GI PCR Panel, Stool:   GI panel PCR evaluates for:  Campylobacter, Plesiomonas shigelloides, Salmonella,  Yersinia enterocolitica, Vibrio, Enteroaggregative  Escherichia coli (EAEC), Enteropathogenic E. coli (EPEC),  Enterotoxigenic E. coli (ETEC) lt/st, Shiga-like  toxin-producing E. coli (STEC) stx1/stx2,  Shigella/Enteroinvasive E. coli (EIEC), Cryptosporidium,  Cyclospora cayetanensis, Entamoeba histolytica, Giardia  lamblia, Adenovirus F 40/41, Astrovirus, Norovirus GI/GII,  Rotavirus A, Sapovirus  **********************************************************  Campylobacter species  ***** CRITICAL RESULT *****  PERSON CALLED / READ-BACK: SIRI SAAVEDRA  DATE / TIME CALLED: 09/11/19 [id number]  CALLED BY: ESSENCE BYRD  TEST PERFORMED AT  [facility]  [address] [place] [address]  Phone: [phone]  Fax:   [phone]  :         DWAINE IBARRA MD  CAMS^Campylobacter species  GI PCR PANEL: DETECTED by PCR (09.10.19 @ 11:42)

## 2019-09-14 ENCOUNTER — TRANSCRIPTION ENCOUNTER (OUTPATIENT)
Age: 44
End: 2019-09-14

## 2019-09-14 VITALS
TEMPERATURE: 98 F | DIASTOLIC BLOOD PRESSURE: 71 MMHG | HEART RATE: 79 BPM | OXYGEN SATURATION: 100 % | SYSTOLIC BLOOD PRESSURE: 105 MMHG | RESPIRATION RATE: 17 BRPM

## 2019-09-14 LAB
ANION GAP SERPL CALC-SCNC: 11 MMO/L — SIGNIFICANT CHANGE UP (ref 7–14)
BACTERIA STL CULT: SIGNIFICANT CHANGE UP
BUN SERPL-MCNC: 5 MG/DL — LOW (ref 7–23)
CALCIUM SERPL-MCNC: 8.5 MG/DL — SIGNIFICANT CHANGE UP (ref 8.4–10.5)
CHLORIDE SERPL-SCNC: 105 MMOL/L — SIGNIFICANT CHANGE UP (ref 98–107)
CO2 SERPL-SCNC: 21 MMOL/L — LOW (ref 22–31)
CREAT SERPL-MCNC: 0.51 MG/DL — SIGNIFICANT CHANGE UP (ref 0.5–1.3)
GLUCOSE BLDC GLUCOMTR-MCNC: 156 MG/DL — HIGH (ref 70–99)
GLUCOSE SERPL-MCNC: 153 MG/DL — HIGH (ref 70–99)
MAGNESIUM SERPL-MCNC: 1.8 MG/DL — SIGNIFICANT CHANGE UP (ref 1.6–2.6)
PHOSPHATE SERPL-MCNC: 3.6 MG/DL — SIGNIFICANT CHANGE UP (ref 2.5–4.5)
POTASSIUM SERPL-MCNC: 3.8 MMOL/L — SIGNIFICANT CHANGE UP (ref 3.5–5.3)
POTASSIUM SERPL-SCNC: 3.8 MMOL/L — SIGNIFICANT CHANGE UP (ref 3.5–5.3)
SODIUM SERPL-SCNC: 137 MMOL/L — SIGNIFICANT CHANGE UP (ref 135–145)

## 2019-09-14 PROCEDURE — 99239 HOSP IP/OBS DSCHRG MGMT >30: CPT

## 2019-09-14 RX ADMIN — SODIUM CHLORIDE 100 MILLILITER(S): 9 INJECTION INTRAMUSCULAR; INTRAVENOUS; SUBCUTANEOUS at 02:05

## 2019-09-14 RX ADMIN — Medication 175 MICROGRAM(S): at 05:16

## 2019-09-14 RX ADMIN — Medication 1: at 08:40

## 2019-09-14 NOTE — PROGRESS NOTE ADULT - ASSESSMENT
Pt is a 44 yo F w/ PMHx DM2, hypothyroidism, ovarian cysts, presenting with LLQ pain for one week found to have campylobacter enteritis.

## 2019-09-14 NOTE — PROGRESS NOTE ADULT - SUBJECTIVE AND OBJECTIVE BOX
SUBJECTIVE:    Patient was seen at the bedside awake and alert. She endorses having gotten some sleep, but inconsistent on account of her roommate that keeps the tv on at night. She denies pain necessitating pain medication overnight. Last episode of emesis was yesterday afternoon post-breakfast. Patient denies emesis since and is tolerating fluids PO. She was able to tolerate some glucerna this morning as well. She denies shortness of breath on exertion. She had 4 bowel movements yesterday, same as the day before, but says she normally has 3 bowel movements per day.  She denies fevers, chills, headache, n/v and bowel movements today, dysuria, leg weakness and joint pain.        OBJECTIVE:  Vital Signs Last 24 Hrs  T(C): 37 (14 Sep 2019 05:15), Max: 37 (14 Sep 2019 05:15)  T(F): 98.6 (14 Sep 2019 05:15), Max: 98.6 (14 Sep 2019 05:15)  HR: 78 (14 Sep 2019 05:15) (72 - 78)  BP: 104/63 (14 Sep 2019 05:15) (104/63 - 121/69)  BP(mean): --  RR: 18 (14 Sep 2019 05:15) (16 - 18)  SpO2: 99% (14 Sep 2019 05:15) (99% - 100%)      GENERAL: NAD, well-developed  HEAD:  Atraumatic, Normocephalic  EYES: EOMI, PERRLA, conjunctiva and sclera clear  NECK: Supple, No JVD  CHEST/LUNG: Clear to auscultation bilaterally; Good flow throughout upper and lower lung fields, no wheezes rales or rhonchi.  HEART: Regular rate and rhythm; No murmurs, rubs, or gallops  ABDOMEN: Soft, Non-tender to percussion, nontender to light palpation, Mildly tender to deep palpation in the LLQ. Nondistended; Bowel sounds present  EXTREMITIES:  2+ Peripheral Pulses, No clubbing, cyanosis, or edema  PSYCH: AAOx3  NEUROLOGY: non-focal  SKIN: No rashes or lesions                                                10.8   7.41  )-----------( 278      ( 13 Sep 2019 07:14 )             33.4       09-14    137  |  105  |  5<L>  ----------------------------<  153<H>  3.8   |  21<L>  |  0.51    Ca    8.5      14 Sep 2019 05:30  Phos  3.6     09-14  Mg     1.8     09-14      Procalcitonin, Serum: 0.08: Procalcitonin (PCT) Interpretation (ng/mL) - Diagnosis of systemic bacterial infection/sepsis:  PCT < 0.5: Systemic infection (sepsis) is not likely and  risk for progression to severe systemic infection is low.    GI PCR Panel, Stool:   GI panel PCR evaluates for:  Campylobacter, Plesiomonas shigelloides, Salmonella,  Yersinia enterocolitica, Vibrio, Enteroaggregative  Escherichia coli (EAEC), Enteropathogenic E. coli (EPEC),  Enterotoxigenic E. coli (ETEC) lt/st, Shiga-like  toxin-producing E. coli (STEC) stx1/stx2,  Shigella/Enteroinvasive E. coli (EIEC), Cryptosporidium,  Cyclospora cayetanensis, Entamoeba histolytica, Giardia  lamblia, Adenovirus F 40/41, Astrovirus, Norovirus GI/GII,  Rotavirus A, Sapovirus  **********************************************************  Campylobacter species  ***** CRITICAL RESULT *****  PERSON CALLED / READ-BACK: SIRI SAAVEDRA  DATE / TIME CALLED: 09/11/19 [id number]  CALLED BY: ESSENCE BYRD  TEST PERFORMED AT  [facility]  [address] [place] [address]  Phone: [phone]  Fax:   [phone]  :         DWAINE IBARRA MD CAMS^Campylobacter species  GI PCR PANEL: DETECTED by PCR (09.10.19 @ 11:42)

## 2019-09-14 NOTE — PROGRESS NOTE ADULT - ATTENDING COMMENTS
Patient seen and examined. Case discussed with the medical team on rounds. I agree with the findings and the plan above.    Patient states that her pain, fevers and diarrhea are improving. PCR +for campylobacter.  As per ID will continue zosyn until today.  No weakness in her extremity or shortness of breath. Diarrhea is improving. She is would like to be evaluated by GI prior to discharge.   Continue the rest of the work up and management as stated above.
Patient seen and examined. Case discussed with the medical team. I agree with the findings and the plan above.     Patient with likely gastroenteritis, unknown etiology in the setting of recent transatlantic travel   Pending GI PCR studies  Continue antibiotics, monitor vitals and lab work  If fever continue and or worsen, would consider ID and GI consults  Continue the rest of the work up and management as stated above.
Tolerating full liquid diet, DC today w/outpt GI followup.   DC planning time 35 minutes
Patient seen and examined. Case discussed with the medical team. I agree with the findings and the plan above.     Patient's fever curve trending lower, however she continues to have diarrhea and lower abdominal pain.   WBC count and procalcitonin wnl, making bacterial etiology less likely  awaiting result of GI PCR  If white counts increase and or if fevers worsens will consult ID  Discussed findings with the patient and family, both of which verbalized understanding (Patient is a physician)  Continue the rest of the work up and management as stated above
Patient seen and examined. Case discussed with the medical team on rounds. I agree with the findings and the plan above.    Patient states that her pain, fevers and diarrhea are subsiding  As per ID will continue zosyn for one day  No weakness in her extremity or shortness of breath  Continue the rest of the work up and management as stated above

## 2019-09-14 NOTE — PROGRESS NOTE ADULT - PROBLEM SELECTOR PLAN 3
Pt with hx of goiter s/p irradiation  - c/w synthroid 175 micrograms qd

## 2019-09-14 NOTE — PROGRESS NOTE ADULT - PROBLEM SELECTOR PLAN 1
Abdominal pain improved throughout day yesterday, now mild at 0/10 at rest without medication.  -Stool becoming more formed 4 movements total yesterday.  - GI-PCR positive for Campylobacter, stool culture sent, no growth to date.  - Zosyn now discontinued after 4 days.  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 Recommendations by the the American Academy of Allergy, Asthma and Immunology are NOT to attempt desensitization for SJS, but discontinue causative agents indefinitely.  - Will plan for discharge off of antibiotics so long as patient's emesis, diarrhea, abd pain and emesis continue to improve and patient can tolerate full liquid diet.

## 2019-09-14 NOTE — CHART NOTE - NSCHARTNOTEFT_GEN_A_CORE
Pt seen and examined. Pt endorses improvement of her generalized pain and is having less BM- 3 episode yesterday. Overall clinical picture seems to be improving hence there is no GI contraindication to discharge with PO abx. Patient expresses concern over this intermittent LLQ pain and still concerned that it could be GI in nature. Explained that CT was negative for diverticulitis but if symptoms persist, pt can follow up with GI as an outpatient.

## 2019-09-14 NOTE — DISCHARGE NOTE NURSING/CASE MANAGEMENT/SOCIAL WORK - PATIENT PORTAL LINK FT
You can access the FollowMyHealth Patient Portal offered by [facility] by registering at the following website: [email or web address]. By joining [facility]’s FollowMyHealth portal, you will also be able to view your health information using other applications (apps) compatible with our system.

## 2019-09-15 LAB — BACTERIA BLD CULT: SIGNIFICANT CHANGE UP

## 2019-10-29 ENCOUNTER — FORM ENCOUNTER (OUTPATIENT)
Age: 44
End: 2019-10-29

## 2019-10-30 ENCOUNTER — OUTPATIENT (OUTPATIENT)
Dept: OUTPATIENT SERVICES | Facility: HOSPITAL | Age: 44
LOS: 1 days | End: 2019-10-30
Payer: COMMERCIAL

## 2019-10-30 ENCOUNTER — APPOINTMENT (OUTPATIENT)
Dept: MAMMOGRAPHY | Facility: IMAGING CENTER | Age: 44
End: 2019-10-30
Payer: COMMERCIAL

## 2019-10-30 DIAGNOSIS — Z98.890 OTHER SPECIFIED POSTPROCEDURAL STATES: Chronic | ICD-10-CM

## 2019-10-30 DIAGNOSIS — N63.0 UNSPECIFIED LUMP IN UNSPECIFIED BREAST: ICD-10-CM

## 2019-10-30 PROCEDURE — G0279: CPT | Mod: 26

## 2019-10-30 PROCEDURE — 77065 DX MAMMO INCL CAD UNI: CPT

## 2019-10-30 PROCEDURE — 77065 DX MAMMO INCL CAD UNI: CPT | Mod: 26,LT

## 2019-10-30 PROCEDURE — G0279: CPT

## 2019-12-25 PROBLEM — R10.2 PELVIC PAIN IN FEMALE: Status: ACTIVE | Noted: 2019-03-20

## 2019-12-31 ENCOUNTER — APPOINTMENT (OUTPATIENT)
Dept: ENDOCRINOLOGY | Facility: CLINIC | Age: 44
End: 2019-12-31
Payer: COMMERCIAL

## 2019-12-31 VITALS
OXYGEN SATURATION: 98 % | BODY MASS INDEX: 28.88 KG/M2 | HEART RATE: 64 BPM | HEIGHT: 67 IN | WEIGHT: 184 LBS | SYSTOLIC BLOOD PRESSURE: 116 MMHG | DIASTOLIC BLOOD PRESSURE: 74 MMHG

## 2019-12-31 DIAGNOSIS — Z82.49 FAMILY HISTORY OF ISCHEMIC HEART DISEASE AND OTHER DISEASES OF THE CIRCULATORY SYSTEM: ICD-10-CM

## 2019-12-31 DIAGNOSIS — E73.9 LACTOSE INTOLERANCE, UNSPECIFIED: ICD-10-CM

## 2019-12-31 DIAGNOSIS — Z80.8 FAMILY HISTORY OF MALIGNANT NEOPLASM OF OTHER ORGANS OR SYSTEMS: ICD-10-CM

## 2019-12-31 DIAGNOSIS — Z83.49 FAMILY HISTORY OF OTHER ENDOCRINE, NUTRITIONAL AND METABOLIC DISEASES: ICD-10-CM

## 2019-12-31 LAB
GLUCOSE BLDC GLUCOMTR-MCNC: 187
HBA1C MFR BLD HPLC: 7.6

## 2019-12-31 PROCEDURE — 82962 GLUCOSE BLOOD TEST: CPT | Mod: NC

## 2019-12-31 PROCEDURE — 99204 OFFICE O/P NEW MOD 45 MIN: CPT | Mod: 25

## 2019-12-31 PROCEDURE — 83036 HEMOGLOBIN GLYCOSYLATED A1C: CPT | Mod: QW

## 2019-12-31 PROCEDURE — 36415 COLL VENOUS BLD VENIPUNCTURE: CPT

## 2020-01-03 PROBLEM — E73.9 LACTOSE INTOLERANCE: Status: ACTIVE | Noted: 2020-01-03

## 2020-01-03 PROBLEM — Z80.8 FAMILY HISTORY OF MALIGNANT NEOPLASM OF THYROID: Status: ACTIVE | Noted: 2020-01-03

## 2020-01-03 PROBLEM — Z83.49 FAMILY HISTORY OF HYPOTHYROIDISM: Status: ACTIVE | Noted: 2020-01-03

## 2020-01-03 PROBLEM — Z82.49 FAMILY HISTORY OF CORONARY ARTERY DISEASE: Status: ACTIVE | Noted: 2020-01-03

## 2020-01-03 RX ORDER — ELECTROLYTES/DEXTROSE
SOLUTION, ORAL ORAL
Refills: 0 | Status: ACTIVE | COMMUNITY
Start: 2020-01-03

## 2020-01-03 RX ORDER — SITAGLIPTIN AND METFORMIN HYDROCHLORIDE 100; 1000 MG/1; MG/1
100-1000 TABLET, FILM COATED, EXTENDED RELEASE ORAL DAILY
Refills: 0 | Status: ACTIVE | COMMUNITY
Start: 2020-01-03

## 2020-01-03 NOTE — HISTORY OF PRESENT ILLNESS
[FreeTextEntry1] : 45 yo F with PMH DM2, Graves disease, irregular menstruation\par \par Prior Endocrinology: Dr. Norwood at [facility]\par \par Dx 1999 with Graves and Graves ophthalmopathy\par associated with  tremors, weight loss, palpitations, and blurred vision and excessive tearing of the eyes\par Had thyroid uptake and scan CARMONA 2000 at [facility]\par On Synthroid 175 mcg qd \par \par irregular periods x 5 years\par periods became more regular post myomectomy\par +hirsutism and excessive hair loss\par more irregular since weight gain\par had 8 periods in the last 12 months\par \par DM2\par Diagnosed at 38 years old\par last ophthalmologist visit 2/2020, last at 3/2019\par on janumet /1000 mg qd + metformin ER 1000 mg qd\par fasting FS 1 x week \par F 150-180\par has 2 meals daily\par B: 2 pieces of toast + egg + water + fruit banana grapes,\par L:  shake + SW\par Snacks: protein bar, carrots\par she is a physician

## 2020-01-03 NOTE — ASSESSMENT
[FreeTextEntry1] : 43 yo F with PMH DM2, Graves disease, irregular menstruation\par \par 1. DM2 - will continue current regimen send log. enquire about coverage for ozempic (sample given)\par \par 2. Graves/post ablative hypothyroidism - check TFTs. Continue Synthroid.Refer for thyroid US\par \par 3 Irregular menstruation - likely 2/2 PCOS - will continue metformin for same with and adjust ozempic as indicated

## 2020-01-08 ENCOUNTER — OTHER (OUTPATIENT)
Age: 45
End: 2020-01-08

## 2020-01-08 LAB
25(OH)D3 SERPL-MCNC: 16.8 NG/ML
ALBUMIN SERPL ELPH-MCNC: 4.5 G/DL
ALP BLD-CCNC: 54 U/L
ALT SERPL-CCNC: 29 U/L
ANION GAP SERPL CALC-SCNC: 13 MMOL/L
AST SERPL-CCNC: 17 U/L
BASOPHILS # BLD AUTO: 0.01 K/UL
BASOPHILS NFR BLD AUTO: 0.1 %
BILIRUB SERPL-MCNC: 0.3 MG/DL
BUN SERPL-MCNC: 5 MG/DL
CALCIUM SERPL-MCNC: 9.5 MG/DL
CHLORIDE SERPL-SCNC: 101 MMOL/L
CHOLEST SERPL-MCNC: 158 MG/DL
CHOLEST/HDLC SERPL: 5.6 RATIO
CK SERPL-CCNC: 46 U/L
CO2 SERPL-SCNC: 23 MMOL/L
CREAT SERPL-MCNC: 0.59 MG/DL
CREAT SPEC-SCNC: 139 MG/DL
EOSINOPHIL # BLD AUTO: 0.16 K/UL
EOSINOPHIL NFR BLD AUTO: 2.3 %
ESTIMATED AVERAGE GLUCOSE: 169 MG/DL
GLUCOSE SERPL-MCNC: 193 MG/DL
HBA1C MFR BLD HPLC: 7.5 %
HCT VFR BLD CALC: 39.6 %
HDLC SERPL-MCNC: 28 MG/DL
HGB BLD-MCNC: 12.4 G/DL
IMM GRANULOCYTES NFR BLD AUTO: 0.3 %
LDLC SERPL CALC-MCNC: 84 MG/DL
LYMPHOCYTES # BLD AUTO: 2.17 K/UL
LYMPHOCYTES NFR BLD AUTO: 31.5 %
MAN DIFF?: NORMAL
MCHC RBC-ENTMCNC: 27.2 PG
MCHC RBC-ENTMCNC: 31.3 GM/DL
MCV RBC AUTO: 86.8 FL
MICROALBUMIN 24H UR DL<=1MG/L-MCNC: <1.2 MG/DL
MICROALBUMIN/CREAT 24H UR-RTO: NORMAL MG/G
MONOCYTES # BLD AUTO: 0.53 K/UL
MONOCYTES NFR BLD AUTO: 7.7 %
NEUTROPHILS # BLD AUTO: 4 K/UL
NEUTROPHILS NFR BLD AUTO: 58.1 %
PLATELET # BLD AUTO: 298 K/UL
POTASSIUM SERPL-SCNC: 4.8 MMOL/L
PROT SERPL-MCNC: 7.3 G/DL
RBC # BLD: 4.56 M/UL
RBC # FLD: 12.6 %
SODIUM SERPL-SCNC: 137 MMOL/L
T3 SERPL-MCNC: 150 NG/DL
T4 FREE SERPL-MCNC: 2.2 NG/DL
THYROGLOB AB SERPL-ACNC: 30.1 IU/ML
THYROPEROXIDASE AB SERPL IA-ACNC: <10 IU/ML
TRIGL SERPL-MCNC: 231 MG/DL
TSH RECEPTOR AB: <1.1 IU/L
TSH SERPL-ACNC: 0.06 UIU/ML
TSI ACT/NOR SER: 0.36 IU/L
WBC # FLD AUTO: 6.89 K/UL

## 2020-01-08 RX ORDER — SEMAGLUTIDE 1.34 MG/ML
2 INJECTION, SOLUTION SUBCUTANEOUS
Qty: 1 | Refills: 3 | Status: ACTIVE | COMMUNITY
Start: 2020-01-08 | End: 1900-01-01

## 2020-01-10 ENCOUNTER — OTHER (OUTPATIENT)
Age: 45
End: 2020-01-10

## 2020-01-17 NOTE — DISCHARGE NOTE NURSING/CASE MANAGEMENT/SOCIAL WORK - NSDCPECAREGIVERED_GEN_ALL_CORE
Yes
Quality 226: Preventive Care And Screening: Tobacco Use: Screening And Cessation Intervention: Patient screened for tobacco use and is an ex/non-smoker
Detail Level: Detailed
Quality 137: Melanoma: Continuity Of Care - Recall System: Patient information entered into a recall system that includes: target date for the next exam specified AND a process to follow up with patients regarding missed or unscheduled appointments
Quality 130: Documentation Of Current Medications In The Medical Record: Current Medications Documented
Quality 110: Preventive Care And Screening: Influenza Immunization: Influenza Immunization Administered during Influenza season

## 2020-04-28 ENCOUNTER — APPOINTMENT (OUTPATIENT)
Dept: OPHTHALMOLOGY | Facility: CLINIC | Age: 45
End: 2020-04-28

## 2020-09-13 ENCOUNTER — TRANSCRIPTION ENCOUNTER (OUTPATIENT)
Age: 45
End: 2020-09-13

## 2020-09-24 ENCOUNTER — NON-APPOINTMENT (OUTPATIENT)
Age: 45
End: 2020-09-24

## 2020-09-24 ENCOUNTER — APPOINTMENT (OUTPATIENT)
Dept: OPHTHALMOLOGY | Facility: CLINIC | Age: 45
End: 2020-09-24
Payer: COMMERCIAL

## 2020-09-24 PROCEDURE — 92004 COMPRE OPH EXAM NEW PT 1/>: CPT

## 2020-09-24 PROCEDURE — 92134 CPTRZ OPH DX IMG PST SGM RTA: CPT

## 2020-12-15 ENCOUNTER — APPOINTMENT (OUTPATIENT)
Dept: DERMATOLOGY | Facility: CLINIC | Age: 45
End: 2020-12-15
Payer: COMMERCIAL

## 2020-12-15 VITALS — BODY MASS INDEX: 29.03 KG/M2 | WEIGHT: 185 LBS | HEIGHT: 67 IN

## 2020-12-15 DIAGNOSIS — L82.1 OTHER SEBORRHEIC KERATOSIS: ICD-10-CM

## 2020-12-15 PROCEDURE — 99072 ADDL SUPL MATRL&STAF TM PHE: CPT

## 2020-12-15 PROCEDURE — 99203 OFFICE O/P NEW LOW 30 MIN: CPT

## 2020-12-21 PROBLEM — Z87.440 HISTORY OF URINARY TRACT INFECTION: Status: RESOLVED | Noted: 2019-03-20 | Resolved: 2020-12-21

## 2020-12-29 ENCOUNTER — LABORATORY RESULT (OUTPATIENT)
Age: 45
End: 2020-12-29

## 2020-12-29 ENCOUNTER — APPOINTMENT (OUTPATIENT)
Dept: DERMATOLOGY | Facility: CLINIC | Age: 45
End: 2020-12-29
Payer: COMMERCIAL

## 2020-12-29 DIAGNOSIS — D48.5 NEOPLASM OF UNCERTAIN BEHAVIOR OF SKIN: ICD-10-CM

## 2020-12-29 PROCEDURE — 11402 EXC TR-EXT B9+MARG 1.1-2 CM: CPT

## 2020-12-29 PROCEDURE — 12031 INTMD RPR S/A/T/EXT 2.5 CM/<: CPT

## 2020-12-29 PROCEDURE — 99072 ADDL SUPL MATRL&STAF TM PHE: CPT

## 2021-01-06 ENCOUNTER — APPOINTMENT (OUTPATIENT)
Dept: DERMATOLOGY | Facility: CLINIC | Age: 46
End: 2021-01-06
Payer: COMMERCIAL

## 2021-01-06 DIAGNOSIS — L76.82 OTHER POSTPROCEDURAL COMPLICATIONS OF SKIN AND SUBCUTANEOUS TISSUE: ICD-10-CM

## 2021-01-06 PROCEDURE — 99024 POSTOP FOLLOW-UP VISIT: CPT

## 2021-01-07 ENCOUNTER — LABORATORY RESULT (OUTPATIENT)
Age: 46
End: 2021-01-07

## 2021-01-11 ENCOUNTER — NON-APPOINTMENT (OUTPATIENT)
Age: 46
End: 2021-01-11

## 2021-01-12 ENCOUNTER — APPOINTMENT (OUTPATIENT)
Dept: DERMATOLOGY | Facility: CLINIC | Age: 46
End: 2021-01-12
Payer: COMMERCIAL

## 2021-01-12 DIAGNOSIS — L72.0 EPIDERMAL CYST: ICD-10-CM

## 2021-01-12 DIAGNOSIS — L82.1 OTHER SEBORRHEIC KERATOSIS: ICD-10-CM

## 2021-01-12 PROCEDURE — 99072 ADDL SUPL MATRL&STAF TM PHE: CPT

## 2021-01-12 PROCEDURE — 99213 OFFICE O/P EST LOW 20 MIN: CPT

## 2021-03-11 NOTE — PATIENT PROFILE ADULT - TOBACCO USE
Never smoker Debridement Text: The wound edges were debrided prior to proceeding with the closure to facilitate wound healing.

## 2021-06-03 ENCOUNTER — TRANSCRIPTION ENCOUNTER (OUTPATIENT)
Age: 46
End: 2021-06-03

## 2021-06-14 ENCOUNTER — NON-APPOINTMENT (OUTPATIENT)
Age: 46
End: 2021-06-14

## 2021-06-14 ENCOUNTER — RESULT REVIEW (OUTPATIENT)
Age: 46
End: 2021-06-14

## 2021-06-14 ENCOUNTER — TRANSCRIPTION ENCOUNTER (OUTPATIENT)
Age: 46
End: 2021-06-14

## 2021-06-14 ENCOUNTER — OUTPATIENT (OUTPATIENT)
Dept: OUTPATIENT SERVICES | Facility: HOSPITAL | Age: 46
LOS: 1 days | End: 2021-06-14
Payer: COMMERCIAL

## 2021-06-14 ENCOUNTER — APPOINTMENT (OUTPATIENT)
Dept: MAMMOGRAPHY | Facility: IMAGING CENTER | Age: 46
End: 2021-06-14
Payer: COMMERCIAL

## 2021-06-14 DIAGNOSIS — Z00.8 ENCOUNTER FOR OTHER GENERAL EXAMINATION: ICD-10-CM

## 2021-06-14 DIAGNOSIS — Z00.00 ENCOUNTER FOR GENERAL ADULT MEDICAL EXAMINATION WITHOUT ABNORMAL FINDINGS: ICD-10-CM

## 2021-06-14 DIAGNOSIS — Z98.890 OTHER SPECIFIED POSTPROCEDURAL STATES: Chronic | ICD-10-CM

## 2021-06-14 PROCEDURE — 77066 DX MAMMO INCL CAD BI: CPT

## 2021-06-14 PROCEDURE — G0279: CPT

## 2021-06-14 PROCEDURE — 76642 ULTRASOUND BREAST LIMITED: CPT | Mod: 26,LT

## 2021-06-14 PROCEDURE — 77066 DX MAMMO INCL CAD BI: CPT | Mod: 26

## 2021-06-14 PROCEDURE — 76642 ULTRASOUND BREAST LIMITED: CPT

## 2021-06-14 PROCEDURE — G0279: CPT | Mod: 26

## 2021-06-15 ENCOUNTER — NON-APPOINTMENT (OUTPATIENT)
Age: 46
End: 2021-06-15

## 2021-06-16 ENCOUNTER — NON-APPOINTMENT (OUTPATIENT)
Age: 46
End: 2021-06-16

## 2021-07-29 ENCOUNTER — APPOINTMENT (OUTPATIENT)
Dept: GASTROENTEROLOGY | Facility: CLINIC | Age: 46
End: 2021-07-29
Payer: COMMERCIAL

## 2021-07-29 VITALS
BODY MASS INDEX: 29.82 KG/M2 | SYSTOLIC BLOOD PRESSURE: 142 MMHG | HEART RATE: 97 BPM | DIASTOLIC BLOOD PRESSURE: 92 MMHG | HEIGHT: 67 IN | WEIGHT: 190 LBS

## 2021-07-29 DIAGNOSIS — R19.7 DIARRHEA, UNSPECIFIED: ICD-10-CM

## 2021-07-29 DIAGNOSIS — G89.29 LEFT LOWER QUADRANT PAIN: ICD-10-CM

## 2021-07-29 DIAGNOSIS — R10.32 LEFT LOWER QUADRANT PAIN: ICD-10-CM

## 2021-07-29 DIAGNOSIS — R76.8 OTHER SPECIFIED ABNORMAL IMMUNOLOGICAL FINDINGS IN SERUM: ICD-10-CM

## 2021-07-29 DIAGNOSIS — R74.8 ABNORMAL LEVELS OF OTHER SERUM ENZYMES: ICD-10-CM

## 2021-07-29 PROCEDURE — 99204 OFFICE O/P NEW MOD 45 MIN: CPT

## 2021-07-29 RX ORDER — LEVOTHYROXINE SODIUM 150 UG/1
150 TABLET ORAL
Qty: 30 | Refills: 3 | Status: DISCONTINUED | COMMUNITY
Start: 2020-01-08 | End: 2021-07-29

## 2021-07-29 RX ORDER — ERGOCALCIFEROL 1.25 MG/1
1.25 MG CAPSULE ORAL
Qty: 8 | Refills: 0 | Status: DISCONTINUED | COMMUNITY
Start: 2020-01-08 | End: 2021-07-29

## 2021-07-29 RX ORDER — LEVOTHYROXINE SODIUM 0.15 MG/1
150 TABLET ORAL
Qty: 30 | Refills: 3 | Status: DISCONTINUED | COMMUNITY
Start: 2020-01-10 | End: 2021-07-29

## 2021-07-29 RX ORDER — METFORMIN HYDROCHLORIDE 1000 MG/1
1000 TABLET, FILM COATED, EXTENDED RELEASE ORAL
Refills: 0 | Status: DISCONTINUED | COMMUNITY
Start: 2020-01-03 | End: 2021-07-29

## 2021-07-29 RX ORDER — DULAGLUTIDE 0.75 MG/.5ML
0.75 INJECTION, SOLUTION SUBCUTANEOUS
Qty: 1 | Refills: 0 | Status: DISCONTINUED | COMMUNITY
Start: 2020-01-08 | End: 2021-07-29

## 2021-07-29 RX ORDER — DOXYCYCLINE HYCLATE 100 MG/1
100 CAPSULE ORAL
Qty: 28 | Refills: 1 | Status: DISCONTINUED | COMMUNITY
Start: 2021-01-06 | End: 2021-07-29

## 2021-07-29 RX ORDER — LIDOCAINE AND PRILOCAINE 25; 25 MG/G; MG/G
2.5-2.5 CREAM TOPICAL
Qty: 30 | Refills: 0 | Status: DISCONTINUED | COMMUNITY
Start: 2021-01-12 | End: 2021-07-29

## 2021-07-30 RX ORDER — GLIMEPIRIDE 4 MG/1
4 TABLET ORAL
Qty: 180 | Refills: 0 | Status: ACTIVE | COMMUNITY
Start: 2021-07-22

## 2021-07-30 NOTE — HISTORY OF PRESENT ILLNESS
[Nausea] : stable nausea [Vomiting] : stable vomiting [Diarrhea] : stable diarrhea [Abdominal Pain] : stable abdominal pain [Abdominal Swelling] : abdominal swelling stable [_________] : Performed [unfilled] [de-identified] :  Almanza presents to the office today for evaluation of LLQ pain, erratic stools, bloating and nausea.\par \par The patient reports that her current GI symptoms started in September 2019 after she returned from a 2 week trip to [place].  At that time, she had LLQ pain, nausea, and diarrhea and she was admitted to [facility].  During her hospitalization, she had fever and leukocytosis.  A CT A/P was unremarkable but she was found to have Campylocbacter jejeuni.  Due to drug allergies (SJS), she was treated with IV Zosyn.  During her hospitalization, her HbA1C was noted to be above 8.  Shortly afterwards, she was diagnosed with Clostridium difficile and was treated.  She was also started on Ozempic for diabetes.\par \par Since then, the patient has continuous bloating with most foods except for starches and nausea with vomiting if she eats too fast or too much.  Of more concern, is an episodic, sharp LLQ pain which occurs every 3 weeks and lasts for about one week.  It is not associated with her menses but she knows eating spicy foods can trigger it.  During the episodes of nonradiating LLQ pain, her stools are usually looser, smaller in volume, and more frequent.  Having a BM relieves the bloating but not always the pain.  When she is feeling well, she usually has 2-3 BMs daily.  She has cut down on many foods including spicy, fried, wheat, red meats, and fiber rich foods.  She denies any blood in her stool.  She has lost weight due to Ozempic and dietary changes.  She denies any FH of GI malignancies or autoimmune disorders.  The patient has been found to have an elevated JOSE CRUZ and liver enzymes in the past.\par \par The patient saw Dr. Sultana Clemente in 2019 for these symptoms.  She underwent an EGD and colonoscopy (records unavailable).  An MR Enterography was performed in December 2019 which showed a 3.4 cm focal thickening of the rectosigmoid colon which was suggested to be possibly stool due to lack of pericolic inflammation.  Dr. Clemente prescribed her an antispasmodic which she never tried.

## 2021-07-30 NOTE — ASSESSMENT
[FreeTextEntry1] : 1.  Episodic LLQ pain with looser stools x 2 years.  May be secondary to IBS-diarrhea/ proctalgia, infectious etiology, proctocolitis, pelvic floor dysfunction, endometriosis or other Gyn etiology, diabetic enteropathy, musculoskeletal pain.  CT A/P in September 2019 unremarkable.  Prior EGD and colonoscopy in 2019 with Dr. Clemente.  MRI Enterography in 2019 with focal thickening of recto-sigmoid colon, ? stool.\par 2.  History of Campylobacter, C.difficile (2019).\par 3.  Bloating, may be medication effect from Ozempic, SIBO, ingestion of fermentable food products, gastroparesis/dysmotility, pancreatic insufficiency.\par 4.  Diabetes mellitus.\par 5.  + JOSE CRUZ.\par 6.  Elevated liver enzymes.\par 7.  Hypothyroidism.\par \par Recs:\par - Prior CT and MRI results reviewed.  Prior inpatient hospital records reviewed.\par - HIPAA waiver signed to obtain EGD and colonoscopy results for review.\par - Labs and stool studies (to be performed during episode) ordered.\par - Patient was advised to consider antispasmodic previously prescribed by Dr. Clemente.\par - Further studies could include repeating MRI Enterography vs flexible sigmoidoscopy.

## 2021-08-02 ENCOUNTER — NON-APPOINTMENT (OUTPATIENT)
Age: 46
End: 2021-08-02

## 2021-09-06 NOTE — REASON FOR VISIT
Benefits, risks, and possible complications of procedure explained to patient/caregiver who verbalized understanding and gave verbal consent. [Annual] : an annual visit.

## 2021-09-08 ENCOUNTER — APPOINTMENT (OUTPATIENT)
Dept: OBGYN | Facility: CLINIC | Age: 46
End: 2021-09-08
Payer: COMMERCIAL

## 2021-09-08 VITALS
SYSTOLIC BLOOD PRESSURE: 135 MMHG | HEIGHT: 67 IN | BODY MASS INDEX: 31.39 KG/M2 | WEIGHT: 200 LBS | HEART RATE: 111 BPM | DIASTOLIC BLOOD PRESSURE: 82 MMHG

## 2021-09-08 DIAGNOSIS — Z01.419 ENCOUNTER FOR GYNECOLOGICAL EXAMINATION (GENERAL) (ROUTINE) W/OUT ABNORMAL FINDINGS: ICD-10-CM

## 2021-09-08 PROCEDURE — 99396 PREV VISIT EST AGE 40-64: CPT

## 2021-09-09 ENCOUNTER — APPOINTMENT (OUTPATIENT)
Dept: OBGYN | Facility: CLINIC | Age: 46
End: 2021-09-09

## 2021-12-14 ENCOUNTER — RESULT REVIEW (OUTPATIENT)
Age: 46
End: 2021-12-14

## 2021-12-14 ENCOUNTER — APPOINTMENT (OUTPATIENT)
Dept: ULTRASOUND IMAGING | Facility: IMAGING CENTER | Age: 46
End: 2021-12-14
Payer: COMMERCIAL

## 2021-12-14 ENCOUNTER — OUTPATIENT (OUTPATIENT)
Dept: OUTPATIENT SERVICES | Facility: HOSPITAL | Age: 46
LOS: 1 days | End: 2021-12-14
Payer: COMMERCIAL

## 2021-12-14 ENCOUNTER — APPOINTMENT (OUTPATIENT)
Dept: MAMMOGRAPHY | Facility: IMAGING CENTER | Age: 46
End: 2021-12-14
Payer: COMMERCIAL

## 2021-12-14 DIAGNOSIS — R92.8 OTHER ABNORMAL AND INCONCLUSIVE FINDINGS ON DIAGNOSTIC IMAGING OF BREAST: ICD-10-CM

## 2021-12-14 DIAGNOSIS — Z98.890 OTHER SPECIFIED POSTPROCEDURAL STATES: Chronic | ICD-10-CM

## 2021-12-14 PROCEDURE — 77065 DX MAMMO INCL CAD UNI: CPT | Mod: 26,LT

## 2021-12-14 PROCEDURE — 76642 ULTRASOUND BREAST LIMITED: CPT

## 2021-12-14 PROCEDURE — 77065 DX MAMMO INCL CAD UNI: CPT

## 2021-12-14 PROCEDURE — 76642 ULTRASOUND BREAST LIMITED: CPT | Mod: 26,LT

## 2021-12-14 PROCEDURE — G0279: CPT | Mod: 26

## 2021-12-14 PROCEDURE — G0279: CPT

## 2022-01-10 ENCOUNTER — RESULT REVIEW (OUTPATIENT)
Age: 47
End: 2022-01-10

## 2022-01-10 ENCOUNTER — APPOINTMENT (OUTPATIENT)
Dept: MAMMOGRAPHY | Facility: IMAGING CENTER | Age: 47
End: 2022-01-10
Payer: COMMERCIAL

## 2022-01-10 ENCOUNTER — OUTPATIENT (OUTPATIENT)
Dept: OUTPATIENT SERVICES | Facility: HOSPITAL | Age: 47
LOS: 1 days | End: 2022-01-10
Payer: COMMERCIAL

## 2022-01-10 DIAGNOSIS — R92.8 OTHER ABNORMAL AND INCONCLUSIVE FINDINGS ON DIAGNOSTIC IMAGING OF BREAST: ICD-10-CM

## 2022-01-10 DIAGNOSIS — Z98.890 OTHER SPECIFIED POSTPROCEDURAL STATES: Chronic | ICD-10-CM

## 2022-01-10 PROCEDURE — 77065 DX MAMMO INCL CAD UNI: CPT

## 2022-01-10 PROCEDURE — 88305 TISSUE EXAM BY PATHOLOGIST: CPT | Mod: 26

## 2022-01-10 PROCEDURE — 77065 DX MAMMO INCL CAD UNI: CPT | Mod: 26,LT

## 2022-01-10 PROCEDURE — 19081 BX BREAST 1ST LESION STRTCTC: CPT | Mod: LT

## 2022-01-10 PROCEDURE — 19081 BX BREAST 1ST LESION STRTCTC: CPT

## 2022-01-10 PROCEDURE — A4648: CPT

## 2022-01-10 PROCEDURE — 88305 TISSUE EXAM BY PATHOLOGIST: CPT

## 2022-01-12 ENCOUNTER — NON-APPOINTMENT (OUTPATIENT)
Age: 47
End: 2022-01-12

## 2022-07-12 ENCOUNTER — TRANSCRIPTION ENCOUNTER (OUTPATIENT)
Age: 47
End: 2022-07-12

## 2022-07-12 ENCOUNTER — RESULT REVIEW (OUTPATIENT)
Age: 47
End: 2022-07-12

## 2022-07-14 ENCOUNTER — OUTPATIENT (OUTPATIENT)
Dept: OUTPATIENT SERVICES | Facility: HOSPITAL | Age: 47
LOS: 1 days | End: 2022-07-14
Payer: COMMERCIAL

## 2022-07-14 ENCOUNTER — APPOINTMENT (OUTPATIENT)
Dept: ULTRASOUND IMAGING | Facility: CLINIC | Age: 47
End: 2022-07-14

## 2022-07-14 ENCOUNTER — APPOINTMENT (OUTPATIENT)
Dept: MAMMOGRAPHY | Facility: CLINIC | Age: 47
End: 2022-07-14

## 2022-07-14 ENCOUNTER — RESULT REVIEW (OUTPATIENT)
Age: 47
End: 2022-07-14

## 2022-07-14 DIAGNOSIS — Z98.890 OTHER SPECIFIED POSTPROCEDURAL STATES: Chronic | ICD-10-CM

## 2022-07-14 DIAGNOSIS — R92.8 OTHER ABNORMAL AND INCONCLUSIVE FINDINGS ON DIAGNOSTIC IMAGING OF BREAST: ICD-10-CM

## 2022-07-14 PROCEDURE — 76641 ULTRASOUND BREAST COMPLETE: CPT | Mod: 26,50

## 2022-07-14 PROCEDURE — 77063 BREAST TOMOSYNTHESIS BI: CPT

## 2022-07-14 PROCEDURE — 76641 ULTRASOUND BREAST COMPLETE: CPT

## 2022-07-14 PROCEDURE — 77063 BREAST TOMOSYNTHESIS BI: CPT | Mod: 26

## 2022-07-14 PROCEDURE — 77067 SCR MAMMO BI INCL CAD: CPT

## 2022-07-14 PROCEDURE — 77067 SCR MAMMO BI INCL CAD: CPT | Mod: 26

## 2022-08-17 DIAGNOSIS — N91.2 AMENORRHEA, UNSPECIFIED: ICD-10-CM

## 2022-08-17 RX ORDER — NORETHINDRONE ACETATE 5 MG/1
5 TABLET ORAL
Qty: 10 | Refills: 2 | Status: ACTIVE | COMMUNITY
Start: 2022-08-17 | End: 1900-01-01

## 2022-10-06 ENCOUNTER — NON-APPOINTMENT (OUTPATIENT)
Age: 47
End: 2022-10-06

## 2022-10-06 ENCOUNTER — APPOINTMENT (OUTPATIENT)
Dept: OPHTHALMOLOGY | Facility: CLINIC | Age: 47
End: 2022-10-06

## 2022-10-06 PROCEDURE — 92014 COMPRE OPH EXAM EST PT 1/>: CPT

## 2022-10-06 PROCEDURE — 92134 CPTRZ OPH DX IMG PST SGM RTA: CPT

## 2023-02-09 ENCOUNTER — APPOINTMENT (OUTPATIENT)
Dept: OBGYN | Facility: CLINIC | Age: 48
End: 2023-02-09

## 2023-09-06 NOTE — ED ADULT TRIAGE NOTE - SOURCE OF INFORMATION
----- Message from Sae Delgadillo MD sent at 9/5/2023  3:40 PM EDT -----  Please contact the patient and let her know that her Pap showed atypical cells of undetermined significance.  Her HPV test was negative.  I recommend a repeat Pap 6 weeks postpartum.  Thank you.  
Patient scheduled for 6/7/18
Patient/Spouse

## 2023-11-15 ENCOUNTER — APPOINTMENT (OUTPATIENT)
Dept: ULTRASOUND IMAGING | Facility: CLINIC | Age: 48
End: 2023-11-15
Payer: COMMERCIAL

## 2023-11-15 ENCOUNTER — APPOINTMENT (OUTPATIENT)
Dept: MAMMOGRAPHY | Facility: CLINIC | Age: 48
End: 2023-11-15
Payer: COMMERCIAL

## 2023-11-15 PROCEDURE — 77067 SCR MAMMO BI INCL CAD: CPT

## 2023-11-15 PROCEDURE — 77063 BREAST TOMOSYNTHESIS BI: CPT

## 2023-11-15 PROCEDURE — 76641 ULTRASOUND BREAST COMPLETE: CPT | Mod: 50

## 2024-05-28 NOTE — ED PROVIDER NOTE - NS ED ATTENDING STATEMENT MOD
Gastroenterology Gastroenterology Pulmonology Pulmonology Pulmonology Gastroenterology Gastroenterology Infectious Disease Nephrology Pulmonology Infectious Disease Infectious Disease Nephrology Nephrology Nephrology Internal Medicine Internal Medicine Internal Medicine I have personally seen and examined this patient. I have fully participated in the care of this patient. I have reviewed all pertinent clinical information, including history, physical exam, plan and the Medical Student's note and agree except as noted.